# Patient Record
Sex: MALE | Race: WHITE | NOT HISPANIC OR LATINO | Employment: OTHER | URBAN - NONMETROPOLITAN AREA
[De-identification: names, ages, dates, MRNs, and addresses within clinical notes are randomized per-mention and may not be internally consistent; named-entity substitution may affect disease eponyms.]

---

## 2023-05-15 ENCOUNTER — HOSPITAL ENCOUNTER (INPATIENT)
Facility: HOSPITAL | Age: 68
LOS: 2 days | Discharge: HOME | End: 2023-05-17
Attending: INTERNAL MEDICINE | Admitting: INTERNAL MEDICINE
Payer: MEDICARE

## 2023-05-15 ENCOUNTER — APPOINTMENT (OUTPATIENT)
Dept: CT IMAGING | Facility: HOSPITAL | Age: 68
End: 2023-05-15
Payer: MEDICARE

## 2023-05-15 ENCOUNTER — APPOINTMENT (OUTPATIENT)
Dept: GENERAL RADIOLOGY | Facility: HOSPITAL | Age: 68
End: 2023-05-15
Payer: MEDICARE

## 2023-05-15 DIAGNOSIS — I21.4 NSTEMI (NON-ST ELEVATED MYOCARDIAL INFARCTION): ICD-10-CM

## 2023-05-15 DIAGNOSIS — R77.8 ELEVATED TROPONIN: ICD-10-CM

## 2023-05-15 DIAGNOSIS — R07.9 ACUTE CHEST PAIN: Primary | ICD-10-CM

## 2023-05-15 LAB
ALBUMIN SERPL-MCNC: 4.8 G/DL (ref 3.5–5.2)
ALBUMIN/GLOB SERPL: 1.6 G/DL
ALP SERPL-CCNC: 71 U/L (ref 39–117)
ALT SERPL W P-5'-P-CCNC: 35 U/L (ref 1–41)
ANION GAP SERPL CALCULATED.3IONS-SCNC: 13 MMOL/L (ref 5–15)
AST SERPL-CCNC: 169 U/L (ref 1–40)
BASOPHILS # BLD AUTO: 0.06 10*3/MM3 (ref 0–0.2)
BASOPHILS NFR BLD AUTO: 0.4 % (ref 0–1.5)
BILIRUB SERPL-MCNC: 0.6 MG/DL (ref 0–1.2)
BUN SERPL-MCNC: 10 MG/DL (ref 8–23)
BUN/CREAT SERPL: 11 (ref 7–25)
CALCIUM SPEC-SCNC: 9.8 MG/DL (ref 8.6–10.5)
CHLORIDE SERPL-SCNC: 100 MMOL/L (ref 98–107)
CO2 SERPL-SCNC: 26 MMOL/L (ref 22–29)
CREAT SERPL-MCNC: 0.91 MG/DL (ref 0.76–1.27)
D DIMER PPP FEU-MCNC: 1.91 MCGFEU/ML (ref 0–0.66)
DEPRECATED RDW RBC AUTO: 41.1 FL (ref 37–54)
EGFRCR SERPLBLD CKD-EPI 2021: 93 ML/MIN/1.73
EOSINOPHIL # BLD AUTO: 0.11 10*3/MM3 (ref 0–0.4)
EOSINOPHIL NFR BLD AUTO: 0.7 % (ref 0.3–6.2)
ERYTHROCYTE [DISTWIDTH] IN BLOOD BY AUTOMATED COUNT: 12.9 % (ref 12.3–15.4)
GEN 5 2HR TROPONIN T REFLEX: 2418 NG/L
GLOBULIN UR ELPH-MCNC: 3 GM/DL
GLUCOSE SERPL-MCNC: 98 MG/DL (ref 65–99)
HCT VFR BLD AUTO: 46.5 % (ref 37.5–51)
HGB BLD-MCNC: 15.5 G/DL (ref 13–17.7)
HOLD SPECIMEN: NORMAL
HOLD SPECIMEN: NORMAL
IMM GRANULOCYTES # BLD AUTO: 0.04 10*3/MM3 (ref 0–0.05)
IMM GRANULOCYTES NFR BLD AUTO: 0.3 % (ref 0–0.5)
LIPASE SERPL-CCNC: 16 U/L (ref 13–60)
LYMPHOCYTES # BLD AUTO: 3.07 10*3/MM3 (ref 0.7–3.1)
LYMPHOCYTES NFR BLD AUTO: 20.8 % (ref 19.6–45.3)
MAGNESIUM SERPL-MCNC: 1.9 MG/DL (ref 1.6–2.4)
MCH RBC QN AUTO: 29.4 PG (ref 26.6–33)
MCHC RBC AUTO-ENTMCNC: 33.3 G/DL (ref 31.5–35.7)
MCV RBC AUTO: 88.1 FL (ref 79–97)
MONOCYTES # BLD AUTO: 1.14 10*3/MM3 (ref 0.1–0.9)
MONOCYTES NFR BLD AUTO: 7.7 % (ref 5–12)
NEUTROPHILS NFR BLD AUTO: 10.35 10*3/MM3 (ref 1.7–7)
NEUTROPHILS NFR BLD AUTO: 70.1 % (ref 42.7–76)
NRBC BLD AUTO-RTO: 0 /100 WBC (ref 0–0.2)
NT-PROBNP SERPL-MCNC: 1351 PG/ML (ref 0–900)
PLATELET # BLD AUTO: 246 10*3/MM3 (ref 140–450)
PMV BLD AUTO: 10.4 FL (ref 6–12)
POTASSIUM SERPL-SCNC: 3.5 MMOL/L (ref 3.5–5.2)
PROT SERPL-MCNC: 7.8 G/DL (ref 6–8.5)
RBC # BLD AUTO: 5.28 10*6/MM3 (ref 4.14–5.8)
SODIUM SERPL-SCNC: 139 MMOL/L (ref 136–145)
TROPONIN T DELTA: -121 NG/L
TROPONIN T SERPL HS-MCNC: 2539 NG/L
WBC NRBC COR # BLD: 14.77 10*3/MM3 (ref 3.4–10.8)
WHOLE BLOOD HOLD COAG: NORMAL
WHOLE BLOOD HOLD SPECIMEN: NORMAL

## 2023-05-15 PROCEDURE — 99285 EMERGENCY DEPT VISIT HI MDM: CPT

## 2023-05-15 PROCEDURE — 71045 X-RAY EXAM CHEST 1 VIEW: CPT

## 2023-05-15 PROCEDURE — 71275 CT ANGIOGRAPHY CHEST: CPT

## 2023-05-15 PROCEDURE — 85025 COMPLETE CBC W/AUTO DIFF WBC: CPT | Performed by: EMERGENCY MEDICINE

## 2023-05-15 PROCEDURE — 83690 ASSAY OF LIPASE: CPT

## 2023-05-15 PROCEDURE — 93005 ELECTROCARDIOGRAM TRACING: CPT | Performed by: INTERNAL MEDICINE

## 2023-05-15 PROCEDURE — 83880 ASSAY OF NATRIURETIC PEPTIDE: CPT

## 2023-05-15 PROCEDURE — 93010 ELECTROCARDIOGRAM REPORT: CPT | Performed by: INTERNAL MEDICINE

## 2023-05-15 PROCEDURE — 83735 ASSAY OF MAGNESIUM: CPT

## 2023-05-15 PROCEDURE — 84484 ASSAY OF TROPONIN QUANT: CPT | Performed by: EMERGENCY MEDICINE

## 2023-05-15 PROCEDURE — 25510000001 IOPAMIDOL PER 1 ML

## 2023-05-15 PROCEDURE — 93005 ELECTROCARDIOGRAM TRACING: CPT

## 2023-05-15 PROCEDURE — 93005 ELECTROCARDIOGRAM TRACING: CPT | Performed by: EMERGENCY MEDICINE

## 2023-05-15 PROCEDURE — 85379 FIBRIN DEGRADATION QUANT: CPT

## 2023-05-15 PROCEDURE — 80053 COMPREHEN METABOLIC PANEL: CPT | Performed by: EMERGENCY MEDICINE

## 2023-05-15 RX ORDER — ASPIRIN 81 MG/1
324 TABLET, CHEWABLE ORAL ONCE
Status: DISCONTINUED | OUTPATIENT
Start: 2023-05-15 | End: 2023-05-17

## 2023-05-15 RX ORDER — SODIUM CHLORIDE 9 MG/ML
40 INJECTION, SOLUTION INTRAVENOUS AS NEEDED
Status: DISCONTINUED | OUTPATIENT
Start: 2023-05-15 | End: 2023-05-17 | Stop reason: HOSPADM

## 2023-05-15 RX ORDER — SODIUM CHLORIDE 0.9 % (FLUSH) 0.9 %
10 SYRINGE (ML) INJECTION AS NEEDED
Status: DISCONTINUED | OUTPATIENT
Start: 2023-05-15 | End: 2023-05-17 | Stop reason: HOSPADM

## 2023-05-15 RX ORDER — SODIUM CHLORIDE 0.9 % (FLUSH) 0.9 %
10 SYRINGE (ML) INJECTION EVERY 12 HOURS SCHEDULED
Status: DISCONTINUED | OUTPATIENT
Start: 2023-05-15 | End: 2023-05-17 | Stop reason: HOSPADM

## 2023-05-15 RX ORDER — ATORVASTATIN CALCIUM 40 MG/1
40 TABLET, FILM COATED ORAL NIGHTLY
Status: DISCONTINUED | OUTPATIENT
Start: 2023-05-15 | End: 2023-05-17 | Stop reason: HOSPADM

## 2023-05-15 RX ORDER — ENOXAPARIN SODIUM 150 MG/ML
1 INJECTION SUBCUTANEOUS ONCE
Status: COMPLETED | OUTPATIENT
Start: 2023-05-15 | End: 2023-05-16

## 2023-05-15 RX ORDER — LABETALOL HYDROCHLORIDE 5 MG/ML
10 INJECTION, SOLUTION INTRAVENOUS ONCE
Status: DISCONTINUED | OUTPATIENT
Start: 2023-05-15 | End: 2023-05-15

## 2023-05-15 RX ORDER — FAMOTIDINE 10 MG/ML
20 INJECTION, SOLUTION INTRAVENOUS ONCE
Status: COMPLETED | OUTPATIENT
Start: 2023-05-15 | End: 2023-05-15

## 2023-05-15 RX ORDER — LABETALOL HYDROCHLORIDE 5 MG/ML
20 INJECTION, SOLUTION INTRAVENOUS ONCE
Status: COMPLETED | OUTPATIENT
Start: 2023-05-15 | End: 2023-05-15

## 2023-05-15 RX ORDER — LISINOPRIL 5 MG/1
5 TABLET ORAL DAILY
Status: DISCONTINUED | OUTPATIENT
Start: 2023-05-16 | End: 2023-05-17 | Stop reason: HOSPADM

## 2023-05-15 RX ORDER — CARVEDILOL 3.12 MG/1
3.12 TABLET ORAL 2 TIMES DAILY WITH MEALS
Status: DISCONTINUED | OUTPATIENT
Start: 2023-05-15 | End: 2023-05-17 | Stop reason: HOSPADM

## 2023-05-15 RX ORDER — NITROGLYCERIN 0.4 MG/1
0.4 TABLET SUBLINGUAL
Status: DISCONTINUED | OUTPATIENT
Start: 2023-05-15 | End: 2023-05-17 | Stop reason: HOSPADM

## 2023-05-15 RX ADMIN — FAMOTIDINE 20 MG: 10 INJECTION INTRAVENOUS at 19:34

## 2023-05-15 RX ADMIN — LABETALOL HYDROCHLORIDE 20 MG: 5 INJECTION INTRAVENOUS at 19:33

## 2023-05-15 RX ADMIN — IOPAMIDOL 100 ML: 755 INJECTION, SOLUTION INTRAVENOUS at 20:48

## 2023-05-15 NOTE — ED PROVIDER NOTES
Subjective   History of Present Illness  Patient is a 67-year-old male who presents emergency department complaining of chest pain.  He states that he was awoken from sleep last night around 12 AM.  He also had a burning sensation in his throat and belched a lot.  He states that the chest pain is a pressure/dull pain that radiates to both arms.  He denies radiation to his back or neck.  He states that he went to see his primary care provider, Dr. Busch, and they sent him to the emergency department.  He took aspirin prior to arrival to the emergency department.  He denies any nausea, vomiting, diarrhea.  Complains of some shortness of breath.  Pain is not worse whenever he takes a deep breath or with exertion.  He is currently not having any chest pain right now.  Denies any diaphoresis.  He denies any tobacco use.  Denies any alcohol use.  States that he has high blood pressure at home, however he has not followed up with the primary care provider in several years.  He does not take any medications for this.  States that his father had a heart attack in his 50s.        Review of Systems   Constitutional: Negative.    HENT: Negative.    Respiratory: Positive for shortness of breath. Negative for cough and wheezing.    Cardiovascular: Positive for chest pain. Negative for palpitations and leg swelling.   Genitourinary: Negative.    Musculoskeletal: Negative.    Skin: Negative.    Neurological: Negative.        History reviewed. No pertinent past medical history.    No Known Allergies    History reviewed. No pertinent surgical history.    History reviewed. No pertinent family history.    Social History     Socioeconomic History   • Marital status:            Objective   Physical Exam  Vitals and nursing note reviewed.   Constitutional:       General: He is not in acute distress.     Appearance: He is well-developed. He is obese. He is not ill-appearing or toxic-appearing.   HENT:      Head: Normocephalic.    Cardiovascular:      Rate and Rhythm: Normal rate and regular rhythm.      Heart sounds: Normal heart sounds.   Pulmonary:      Effort: Pulmonary effort is normal.      Breath sounds: Normal breath sounds.   Abdominal:      General: Bowel sounds are normal.      Palpations: Abdomen is soft.   Musculoskeletal:         General: Normal range of motion.      Cervical back: Normal range of motion.      Right lower leg: No edema.      Left lower leg: No edema.   Skin:     General: Skin is warm.   Neurological:      General: No focal deficit present.      Mental Status: He is alert and oriented to person, place, and time.   Psychiatric:         Mood and Affect: Mood normal.         Behavior: Behavior normal.         Procedures           ED Course  ED Course as of 05/15/23 6597   Mon May 15, 2023   2010 Updated patient about labs. Ordered CTA chest. [KR]   9004 Spoke with Dr. Mims, on-call cardiologist, about lab work and CTA chest results.  I told him that he received aspirin before arriving to the emergency department.  He instructed to order Lovenox 1 mg/kg as well.  Dr. Mims has agreed to admitting this patient.  Plan to admit the patient to telemetry.  Patient agreeable to this plan. [KR]      ED Course User Index  [KR] Lesa Jonas APRN                                           Medical Decision Making  Lm Nguyễn is a very pleasant 67 y.o. male who presents to the ED for chest pain.     Patient was non-toxic and not-ill appearing on arrival.  Hypertensive on arrival, but other vital signs stable.    Patient's presentation raises suspicion for differentials including, but not limited to, ACS, NSTEMI, pulmonary embolism, pneumonia.     Given this, Lm was placed on the monitor. Laboratory studies and imaging studies were ordered including CBC, CMP, lipase, BNP, D-dimer, high-sensitivity troponin, magnesium, EKG, chest x-ray, CTA chest.     High-sensitivity troponin 2539 with a reflex of 2418.  Delta -121.   BNP 1351.  D-dimer 1.91, which warranted a CTA chest.    Lm was given IV Pepcid. He was also given IV labetalol for hypertension. Therapeutic Lovenox 1 mg/kg was ordered per Dr. Mims request.  Patient stated that he took aspirin prior to arrival to the emergency department.    Imaging was reviewed by Dr. Jimmy Pretty.  Chest x-ray reveals no acute disease.  CTA chest reveals no evidence of pulmonary thromboembolic disease. No evidence of aneurysm or dissection. There is some mild plaquing and mural thrombus with mild stenosis in the proximal segment left subclavian artery. No discrete intimal flap is demonstrated. No evidence of consolidative pneumonia or effusion. There is mild basilar atelectasis. Cardiomegaly with moderate coronary calcifications. Elevated right heart pressure suspected with reflux of contrast into the intrahepatic IVC. Mild bronchial wall thickening bilaterally suggesting an element of bronchitis or reactive airway disease.    Decision rules/scores evaluated: Heart score 4    Labs were reviewed and pertinent for elevated troponin and acute chest pain. Likely NSTEMI given patient's symptoms and elevated troponin.  EKG revealed T wave inversions in the lateral leads, no ST elevation.  CTA chest ruled out pulmonary embolism or infection. Case discussed with Dr. Mims, on-call cardiologist. Informed him of patient symptoms, elevated troponin and CTA chest results. Dr. Mims graciously agreed to admitting this patient.    I discussed lab work and imaging with the patient and wife.  I discussed that Dr. Mims plans to admit this patient for further testing.  They are agreeable to this plan.    Signed by:   OPAL Loving 5/15/2023 23:16 CDT     Dragon disclaimer:  Part of this note may be an electronic transcription/translation of spoken language to printed text using the Dragon Dictation System.     Acute chest pain: acute illness or injury  Elevated troponin: acute illness or  injury  Amount and/or Complexity of Data Reviewed  Radiology: ordered.  ECG/medicine tests: ordered.      Risk  Prescription drug management.  Decision regarding hospitalization.          Final diagnoses:   Acute chest pain   Elevated troponin       ED Disposition  ED Disposition     ED Disposition   Decision to Admit    Condition   --    Comment   Level of Care: Telemetry [5]   Diagnosis: Acute chest pain [961144]   Admitting Physician: TAINA CURRY [[id number]]               No follow-up provider specified.       Medication List      No changes were made to your prescriptions during this visit.          Lesa Jonas, APRN  05/15/23 4513

## 2023-05-15 NOTE — Clinical Note
First balloon inflation max pressure = 14 guillermo. First balloon inflation duration = 20 seconds. Second inflation of balloon - Max pressure = 16 guillermo. 2nd Inflation of balloon - Duration = 20 seconds.

## 2023-05-15 NOTE — Clinical Note
Level of Care: Telemetry [5]   Diagnosis: NSTEMI (non-ST elevated myocardial infarction) [865884]   Certification: I Certify That Inpatient Hospital Services Are Medically Necessary For Greater Than 2 Midnights

## 2023-05-15 NOTE — Clinical Note
First balloon inflation max pressure = 18 guillermo. First balloon inflation duration = 20 seconds. Second inflation of balloon - Max pressure = 18 guillermo. 2nd Inflation of balloon - Duration = 20 seconds.

## 2023-05-15 NOTE — Clinical Note
First balloon inflation max pressure = 14 guillermo. First balloon inflation duration = 20 seconds. Second inflation of balloon - Max pressure = 14 guillermo. 2nd Inflation of balloon - Duration = 20 seconds.

## 2023-05-16 PROBLEM — R03.0 ELEVATED BLOOD PRESSURE READING: Status: ACTIVE | Noted: 2023-05-16

## 2023-05-16 PROBLEM — I73.9 SUBCLAVIAN ARTERY DISEASE: Status: ACTIVE | Noted: 2023-05-16

## 2023-05-16 PROBLEM — E78.00 PURE HYPERCHOLESTEROLEMIA: Status: ACTIVE | Noted: 2023-05-16

## 2023-05-16 LAB
ACT BLD: 209 SECONDS (ref 82–152)
ANION GAP SERPL CALCULATED.3IONS-SCNC: 12 MMOL/L (ref 5–15)
BUN SERPL-MCNC: 12 MG/DL (ref 8–23)
BUN/CREAT SERPL: 12 (ref 7–25)
CALCIUM SPEC-SCNC: 9.6 MG/DL (ref 8.6–10.5)
CHLORIDE SERPL-SCNC: 101 MMOL/L (ref 98–107)
CHOLEST SERPL-MCNC: 266 MG/DL (ref 0–200)
CO2 SERPL-SCNC: 24 MMOL/L (ref 22–29)
CREAT SERPL-MCNC: 1 MG/DL (ref 0.76–1.27)
DEPRECATED RDW RBC AUTO: 41.8 FL (ref 37–54)
EGFRCR SERPLBLD CKD-EPI 2021: 83 ML/MIN/1.73
ERYTHROCYTE [DISTWIDTH] IN BLOOD BY AUTOMATED COUNT: 12.8 % (ref 12.3–15.4)
GLUCOSE SERPL-MCNC: 127 MG/DL (ref 65–99)
HBA1C MFR BLD: 5.8 % (ref 4.8–5.6)
HCT VFR BLD AUTO: 45.4 % (ref 37.5–51)
HDLC SERPL-MCNC: 33 MG/DL (ref 40–60)
HGB BLD-MCNC: 15.1 G/DL (ref 13–17.7)
LDLC SERPL CALC-MCNC: 206 MG/DL (ref 0–100)
LDLC/HDLC SERPL: 6.18 {RATIO}
MCH RBC QN AUTO: 29.6 PG (ref 26.6–33)
MCHC RBC AUTO-ENTMCNC: 33.3 G/DL (ref 31.5–35.7)
MCV RBC AUTO: 89 FL (ref 79–97)
PLATELET # BLD AUTO: 243 10*3/MM3 (ref 140–450)
PMV BLD AUTO: 10.5 FL (ref 6–12)
POTASSIUM SERPL-SCNC: 3.6 MMOL/L (ref 3.5–5.2)
RBC # BLD AUTO: 5.1 10*6/MM3 (ref 4.14–5.8)
SODIUM SERPL-SCNC: 137 MMOL/L (ref 136–145)
TRIGL SERPL-MCNC: 146 MG/DL (ref 0–150)
VLDLC SERPL-MCNC: 27 MG/DL (ref 5–40)
WBC NRBC COR # BLD: 11.52 10*3/MM3 (ref 3.4–10.8)

## 2023-05-16 PROCEDURE — C1894 INTRO/SHEATH, NON-LASER: HCPCS | Performed by: EMERGENCY MEDICINE

## 2023-05-16 PROCEDURE — 25010000002 HEPARIN (PORCINE) 2000-0.9 UNIT/L-% SOLUTION: Performed by: EMERGENCY MEDICINE

## 2023-05-16 PROCEDURE — C1887 CATHETER, GUIDING: HCPCS | Performed by: EMERGENCY MEDICINE

## 2023-05-16 PROCEDURE — 99152 MOD SED SAME PHYS/QHP 5/>YRS: CPT | Performed by: EMERGENCY MEDICINE

## 2023-05-16 PROCEDURE — C1874 STENT, COATED/COV W/DEL SYS: HCPCS | Performed by: EMERGENCY MEDICINE

## 2023-05-16 PROCEDURE — 93458 L HRT ARTERY/VENTRICLE ANGIO: CPT | Performed by: EMERGENCY MEDICINE

## 2023-05-16 PROCEDURE — 25010000002 FENTANYL CITRATE (PF) 50 MCG/ML SOLUTION: Performed by: EMERGENCY MEDICINE

## 2023-05-16 PROCEDURE — 85347 COAGULATION TIME ACTIVATED: CPT

## 2023-05-16 PROCEDURE — B2111ZZ FLUOROSCOPY OF MULTIPLE CORONARY ARTERIES USING LOW OSMOLAR CONTRAST: ICD-10-PCS | Performed by: EMERGENCY MEDICINE

## 2023-05-16 PROCEDURE — 80061 LIPID PANEL: CPT | Performed by: INTERNAL MEDICINE

## 2023-05-16 PROCEDURE — C1769 GUIDE WIRE: HCPCS | Performed by: EMERGENCY MEDICINE

## 2023-05-16 PROCEDURE — 93010 ELECTROCARDIOGRAM REPORT: CPT | Performed by: INTERNAL MEDICINE

## 2023-05-16 PROCEDURE — C1725 CATH, TRANSLUMIN NON-LASER: HCPCS | Performed by: EMERGENCY MEDICINE

## 2023-05-16 PROCEDURE — 25010000002 HEPARIN (PORCINE) 1000-0.9 UT/500ML-% SOLUTION: Performed by: EMERGENCY MEDICINE

## 2023-05-16 PROCEDURE — 83036 HEMOGLOBIN GLYCOSYLATED A1C: CPT | Performed by: INTERNAL MEDICINE

## 2023-05-16 PROCEDURE — B2151ZZ FLUOROSCOPY OF LEFT HEART USING LOW OSMOLAR CONTRAST: ICD-10-PCS | Performed by: EMERGENCY MEDICINE

## 2023-05-16 PROCEDURE — 25010000002 HEPARIN (PORCINE) PER 1000 UNITS: Performed by: EMERGENCY MEDICINE

## 2023-05-16 PROCEDURE — 25010000002 MIDAZOLAM PER 1 MG: Performed by: EMERGENCY MEDICINE

## 2023-05-16 PROCEDURE — 80048 BASIC METABOLIC PNL TOTAL CA: CPT | Performed by: INTERNAL MEDICINE

## 2023-05-16 PROCEDURE — 99153 MOD SED SAME PHYS/QHP EA: CPT | Performed by: EMERGENCY MEDICINE

## 2023-05-16 PROCEDURE — 25010000002 ENOXAPARIN PER 10 MG

## 2023-05-16 PROCEDURE — 85027 COMPLETE CBC AUTOMATED: CPT | Performed by: INTERNAL MEDICINE

## 2023-05-16 PROCEDURE — 36415 COLL VENOUS BLD VENIPUNCTURE: CPT | Performed by: INTERNAL MEDICINE

## 2023-05-16 PROCEDURE — 99223 1ST HOSP IP/OBS HIGH 75: CPT | Performed by: NURSE PRACTITIONER

## 2023-05-16 PROCEDURE — 4A023N7 MEASUREMENT OF CARDIAC SAMPLING AND PRESSURE, LEFT HEART, PERCUTANEOUS APPROACH: ICD-10-PCS | Performed by: EMERGENCY MEDICINE

## 2023-05-16 PROCEDURE — 92928 PRQ TCAT PLMT NTRAC ST 1 LES: CPT | Performed by: EMERGENCY MEDICINE

## 2023-05-16 PROCEDURE — C9600 PERC DRUG-EL COR STENT SING: HCPCS | Performed by: EMERGENCY MEDICINE

## 2023-05-16 PROCEDURE — 25510000001 IOPAMIDOL PER 1 ML: Performed by: EMERGENCY MEDICINE

## 2023-05-16 PROCEDURE — B41F1ZZ FLUOROSCOPY OF RIGHT LOWER EXTREMITY ARTERIES USING LOW OSMOLAR CONTRAST: ICD-10-PCS | Performed by: EMERGENCY MEDICINE

## 2023-05-16 PROCEDURE — 027034Z DILATION OF CORONARY ARTERY, ONE ARTERY WITH DRUG-ELUTING INTRALUMINAL DEVICE, PERCUTANEOUS APPROACH: ICD-10-PCS | Performed by: EMERGENCY MEDICINE

## 2023-05-16 PROCEDURE — C1760 CLOSURE DEV, VASC: HCPCS | Performed by: EMERGENCY MEDICINE

## 2023-05-16 PROCEDURE — 25010000002 DIPHENHYDRAMINE PER 50 MG: Performed by: EMERGENCY MEDICINE

## 2023-05-16 DEVICE — XIENCE SKYPOINT™ EVEROLIMUS ELUTING CORONARY STENT SYSTEM 4.50 MM X 18 MM / RAPID-EXCHANGE
Type: IMPLANTABLE DEVICE | Site: CORONARY | Status: FUNCTIONAL
Brand: XIENCE SKYPOINT™

## 2023-05-16 RX ORDER — MIDAZOLAM HYDROCHLORIDE 1 MG/ML
INJECTION INTRAMUSCULAR; INTRAVENOUS
Status: DISCONTINUED | OUTPATIENT
Start: 2023-05-16 | End: 2023-05-16 | Stop reason: HOSPADM

## 2023-05-16 RX ORDER — NITROGLYCERIN 0.4 MG/1
0.4 TABLET SUBLINGUAL
Status: DISCONTINUED | OUTPATIENT
Start: 2023-05-16 | End: 2023-05-17 | Stop reason: HOSPADM

## 2023-05-16 RX ORDER — LIDOCAINE HYDROCHLORIDE 20 MG/ML
INJECTION, SOLUTION INFILTRATION; PERINEURAL
Status: DISCONTINUED | OUTPATIENT
Start: 2023-05-16 | End: 2023-05-16 | Stop reason: HOSPADM

## 2023-05-16 RX ORDER — HEPARIN SODIUM 5000 [USP'U]/ML
INJECTION, SOLUTION INTRAVENOUS; SUBCUTANEOUS
Status: DISCONTINUED | OUTPATIENT
Start: 2023-05-16 | End: 2023-05-16 | Stop reason: HOSPADM

## 2023-05-16 RX ORDER — SODIUM CHLORIDE 9 MG/ML
100 INJECTION, SOLUTION INTRAVENOUS CONTINUOUS
Status: DISCONTINUED | OUTPATIENT
Start: 2023-05-16 | End: 2023-05-17 | Stop reason: HOSPADM

## 2023-05-16 RX ORDER — FENTANYL CITRATE 50 UG/ML
INJECTION, SOLUTION INTRAMUSCULAR; INTRAVENOUS
Status: DISCONTINUED | OUTPATIENT
Start: 2023-05-16 | End: 2023-05-16 | Stop reason: HOSPADM

## 2023-05-16 RX ORDER — ACETAMINOPHEN 325 MG/1
650 TABLET ORAL EVERY 4 HOURS PRN
Status: DISCONTINUED | OUTPATIENT
Start: 2023-05-16 | End: 2023-05-17 | Stop reason: HOSPADM

## 2023-05-16 RX ORDER — SODIUM CHLORIDE 9 MG/ML
40 INJECTION, SOLUTION INTRAVENOUS AS NEEDED
Status: DISCONTINUED | OUTPATIENT
Start: 2023-05-16 | End: 2023-05-16 | Stop reason: HOSPADM

## 2023-05-16 RX ORDER — SODIUM CHLORIDE 0.9 % (FLUSH) 0.9 %
3-10 SYRINGE (ML) INJECTION AS NEEDED
Status: DISCONTINUED | OUTPATIENT
Start: 2023-05-16 | End: 2023-05-16 | Stop reason: HOSPADM

## 2023-05-16 RX ORDER — DIPHENHYDRAMINE HYDROCHLORIDE 50 MG/ML
INJECTION INTRAMUSCULAR; INTRAVENOUS
Status: DISCONTINUED | OUTPATIENT
Start: 2023-05-16 | End: 2023-05-16 | Stop reason: HOSPADM

## 2023-05-16 RX ORDER — HEPARIN SODIUM 200 [USP'U]/100ML
INJECTION, SOLUTION INTRAVENOUS
Status: DISCONTINUED | OUTPATIENT
Start: 2023-05-16 | End: 2023-05-16 | Stop reason: HOSPADM

## 2023-05-16 RX ORDER — ONDANSETRON 2 MG/ML
4 INJECTION INTRAMUSCULAR; INTRAVENOUS EVERY 6 HOURS PRN
Status: DISCONTINUED | OUTPATIENT
Start: 2023-05-16 | End: 2023-05-16 | Stop reason: HOSPADM

## 2023-05-16 RX ORDER — NITROGLYCERIN 0.4 MG/1
0.4 TABLET SUBLINGUAL
Status: DISCONTINUED | OUTPATIENT
Start: 2023-05-16 | End: 2023-05-16 | Stop reason: HOSPADM

## 2023-05-16 RX ORDER — SODIUM CHLORIDE 0.9 % (FLUSH) 0.9 %
3 SYRINGE (ML) INJECTION EVERY 12 HOURS SCHEDULED
Status: DISCONTINUED | OUTPATIENT
Start: 2023-05-16 | End: 2023-05-16 | Stop reason: HOSPADM

## 2023-05-16 RX ADMIN — SODIUM CHLORIDE 100 ML/HR: 9 INJECTION, SOLUTION INTRAVENOUS at 22:27

## 2023-05-16 RX ADMIN — SODIUM CHLORIDE 100 ML/HR: 9 INJECTION, SOLUTION INTRAVENOUS at 12:45

## 2023-05-16 RX ADMIN — ENOXAPARIN SODIUM 105 MG: 150 INJECTION SUBCUTANEOUS at 00:43

## 2023-05-16 RX ADMIN — ASPIRIN 81 MG: 81 TABLET, COATED ORAL at 08:55

## 2023-05-16 RX ADMIN — Medication 10 ML: at 00:44

## 2023-05-16 RX ADMIN — ATORVASTATIN CALCIUM 40 MG: 40 TABLET, FILM COATED ORAL at 00:43

## 2023-05-16 RX ADMIN — TICAGRELOR 90 MG: 90 TABLET ORAL at 22:31

## 2023-05-16 RX ADMIN — LISINOPRIL 5 MG: 5 TABLET ORAL at 08:55

## 2023-05-16 RX ADMIN — CARVEDILOL 3.12 MG: 3.12 TABLET, FILM COATED ORAL at 00:43

## 2023-05-16 RX ADMIN — CARVEDILOL 3.12 MG: 3.12 TABLET, FILM COATED ORAL at 08:55

## 2023-05-16 RX ADMIN — CARVEDILOL 3.12 MG: 3.12 TABLET, FILM COATED ORAL at 17:08

## 2023-05-16 RX ADMIN — ATORVASTATIN CALCIUM 40 MG: 40 TABLET, FILM COATED ORAL at 20:09

## 2023-05-16 RX ADMIN — Medication 10 ML: at 08:55

## 2023-05-16 NOTE — PAYOR COMM NOTE
"5/16/23. [facility] [phone]  [phone]  ER ADMIT TO OBSERVATION ON 5/15/23. INPATIENT ORDER ON 5/16/23.    FAXING FOR REVIEW.                SHIRA GARCIA     Date of Birth   1955    Social Security Number       Address   [address] [address]    Home Phone       MRN   [id number]       [employer]   [employer]    Marital Status                               Admission Date   5/15/23    Admission Type   Emergency    Admitting Provider   Dwayne Mims MD    Attending Provider   Dwayne Mims MD    Department, Room/Bed   [facility] CATH LAB, Pool/CATH       Discharge Date       Discharge Disposition       Discharge Destination                                 Attending Provider: Dwayne Mims MD    Allergies: No Known Allergies    Isolation: None   Infection: None   Code Status: Not on file    Ht: 180.3 cm (71\")   Wt: 105 kg (231 lb 6.4 oz)    Admission Cmt: None   Principal Problem: NSTEMI (non-ST elevated myocardial infarction) [I21.4]                   Active Insurance as of 5/15/2023       Primary Coverage       Payor Plan Insurance Group Employer/Plan Group    HUMANA MEDICARE REPLACEMENT HUMANA MEDICARE REPLACEMENT [id number]       Payor Plan Address Payor Plan Phone Number Payor Plan Fax Number Effective Dates    [address] [address] [phone]  1/1/2022 - None Entered    [facility] [address]         Subscriber Name Subscriber Birth Date Member ID       SHIRA GARCIA 1955 [id number]                     Emergency Contacts        (Rel.) Home Phone Work Phone Mobile Phone    veena thomas (Relative) -- -- [phone]          Lesa Jonas APRN   Nurse Practitioner  Emergency Medicine     ED Provider Notes      Cosign Needed     Date of Service: 05/15/23 1837  Creation Time: 05/15/23 1837     Cosign Needed       Expand All Collapse All       Subjective []Expand by Default     History of Present " Illness  Patient is a 67-year-old male who presents emergency department complaining of chest pain.  He states that he was awoken from sleep last night around 12 AM.  He also had a burning sensation in his throat and belched a lot.  He states that the chest pain is a pressure/dull pain that radiates to both arms.  He denies radiation to his back or neck.  He states that he went to see his primary care provider, Dr. Busch, and they sent him to the emergency department.  He took aspirin prior to arrival to the emergency department.  He denies any nausea, vomiting, diarrhea.  Complains of some shortness of breath.  Pain is not worse whenever he takes a deep breath or with exertion.  He is currently not having any chest pain right now.  Denies any diaphoresis.  He denies any tobacco use.  Denies any alcohol use.  States that he has high blood pressure at home, however he has not followed up with the primary care provider in several years.  He does not take any medications for this.  States that his father had a heart attack in his 50s.           Review of Systems  Constitutional: Negative.    HENT: Negative.    Respiratory: Positive for shortness of breath. Negative for cough and wheezing.    Cardiovascular: Positive for chest pain. Negative for palpitations and leg swelling.  Genitourinary: Negative.    Musculoskeletal: Negative.    Skin: Negative.    Neurological: Negative.                   cesar Nguyễn is a very pleasant 67 y.o. male who presents to the ED for chest pain.      Patient was non-toxic and not-ill appearing on arrival.  Hypertensive on arrival, but other vital signs stable.     Patient's presentation raises suspicion for differentials including, but not limited to, ACS, NSTEMI, pulmonary embolism, pneumonia.      Given this, Lm was placed on the monitor. Laboratory studies and imaging studies were ordered including CBC, CMP, lipase, BNP, D-dimer, high-sensitivity troponin, magnesium, EKG, chest  x-ray, CTA chest.      High-sensitivity troponin 2539 with a reflex of 2418.  Delta -121.  BNP 1351.  D-dimer 1.91, which warranted a CTA chest.     Lm was given IV Pepcid. He was also given IV labetalol for hypertension. Therapeutic Lovenox 1 mg/kg was ordered per Dr. Mims request.  Patient stated that he took aspirin prior to arrival to the emergency department.     Imaging was reviewed by Dr. Jimmy Pretty.  Chest x-ray reveals no acute disease.  CTA chest reveals no evidence of pulmonary thromboembolic disease. No evidence of aneurysm or dissection. There is some mild plaquing and mural thrombus with mild stenosis in the proximal segment left subclavian artery. No discrete intimal flap is demonstrated. No evidence of consolidative pneumonia or effusion. There is mild basilar atelectasis. Cardiomegaly with moderate coronary calcifications. Elevated right heart pressure suspected with reflux of contrast into the intrahepatic IVC. Mild bronchial wall thickening bilaterally suggesting an element of bronchitis or reactive airway disease.     Decision rules/scores evaluated: Heart score 4     Labs were reviewed and pertinent for elevated troponin and acute chest pain. Likely NSTEMI given patient's symptoms and elevated troponin.  EKG revealed T wave inversions in the lateral leads, no ST elevation.  CTA chest ruled out pulmonary embolism or infection. Case discussed with Dr. Mims, on-call cardiologist. Informed him of patient symptoms, elevated troponin and CTA chest results. Dr. Mims graciously agreed to admitting this patient.        Acute chest pain: acute illness or injury  Elevated troponin: acute illness or injury  Amount and/or Complexity of Data Reviewed  Radiology: ordered.  ECG/medicine tests: ordered.        Risk  Prescription drug management.  Decision regarding hospitalization.              Final diagnoses:   Acute chest pain   Elevated troponin         ED Disposition        ED Disposition       ED Disposition   Decision to Admit    Condition   --    Comment   Level of Care: Telemetry [5]   Diagnosis: Acute chest pain [[id number]]   Admitting Physician: TAINA CURRY [[id number]]                         Marco Antonio Soria APRN   Nurse Practitioner  Cardiology     H&P      Cosign Needed     Date of Service: 05/16/23 0840  Creation Time: 05/16/23 0840     Cosign Needed       Expand All Collapse All    [facility] HEART [facility] -  History and Physical      LOS: 1 day   Patient Care Team:  Provider, No Known as PCP - General     Chief Complaint: Chest Pain         Subjective []Expand by Default        Interval History:   Patient presents to the ER for chest pain. He notes this pain awoke him from his sleep. He notes he has been feeling fine leading up to this pain. He notes that 3-4 months ago he had an intense pain in his abdomen that he thought he might die for- but this has resolved. He is active and has not been limited. Denies fever. He notes last year he established with Dr. Busch. He takes no medications- notes he has no medical problems. He notes he has a family history of coronary artery disease. He smoked for roughly 10 years but quit 40 + years ago. In the ER he was found to have Elevated Troponin of 2,539 with ST changes on EKG. His LDL was found to be 206 and total 266. A1C 5.8. His WBC was 14 that trended down. Denies fever, cough or hematuria. He had an abnormal D-dimer and a CTA of chest was ordered. CTA showed no pulmonary embolism. He was noted to have mild plaquing and mural thrombus with mild stenosis of proximal segment of subclavian artery. Noted to have cardiomegaly with with coronary calcifications with elevated right heart pressure. Patient's blood pressure was elevated but has responded to Coreg and Lisinopril.      Review of Systems:      Review of Systems   Constitutional:  Negative for activity change, appetite change, chills, fatigue, fever and unexpected weight change.   HENT:   Negative for congestion, dental problem and trouble swallowing.    Eyes:  Negative for visual disturbance.   Respiratory:  Negative for cough, chest tightness and shortness of breath.    Cardiovascular:  Positive for chest pain. Negative for palpitations and leg swelling.   Gastrointestinal:  Negative for abdominal pain, diarrhea, nausea and vomiting.   Endocrine: Negative for cold intolerance and heat intolerance.   Genitourinary:  Negative for dysuria.   Musculoskeletal:  Negative for back pain and neck pain.   Skin:  Negative for color change and wound.   Allergic/Immunologic: Negative for immunocompromised state.   Neurological:  Negative for dizziness and light-headedness.   Hematological:  Negative for adenopathy. Does not bruise/bleed easily.   Psychiatric/Behavioral:  Negative for agitation.                       Vital Sign Min/Max for last 24 hours  Temp  Min: 98.7 °F (37.1 °C)  Max: 100 °F (37.8 °C)   BP  Min: 121/71  Max: 198/94   Pulse  Min: 70  Max: 83   Resp  Min: 16  Max: 18   SpO2  Min: 94 %  Max: 98 %   No data recorded   Weight  Min: 105 kg (231 lb 6.4 oz)  Max: 107 kg (236 lb)      Constitutional:       Appearance: Healthy appearance. Well-developed and not in distress.   Eyes:      Pupils: Pupils are equal, round, and reactive to light.   HENT:      Head: Normocephalic and atraumatic.    Mouth/Throat:      Pharynx: Oropharynx is clear.   Neck:      Vascular: No carotid bruit or JVD.   Pulmonary:      Effort: Pulmonary effort is normal.      Breath sounds: Normal breath sounds.   Cardiovascular:      Normal rate. Regular rhythm.   Pulses:     Intact distal pulses.   Edema:     Peripheral edema absent.   Abdominal:      General: Bowel sounds are normal.      Palpations: Abdomen is soft.   Musculoskeletal: Normal range of motion.      Cervical back: Normal range of motion and neck supple. Skin:     General: Skin is warm and dry.   Neurological:      Mental Status: Alert and oriented to person,  place, and time.      Deep Tendon Reflexes: Reflexes are normal and symmetric.   Psychiatric:         Behavior: Behavior normal.         Thought Content: Thought content normal.         Judgment: Judgment jarrod            Procedure Component Value Units Date/Time     Basic Metabolic Panel [[id number]]  (Abnormal) Collected: 05/16/23 0415     Specimen: Blood Updated: 05/16/23 0457       Glucose 127 mg/dL         BUN 12 mg/dL         Creatinine 1.00 mg/dL         Sodium 137 mmol/L         Potassium 3.6 mmol/L         Chloride 101 mmol/L         CO2 24.0 mmol/L         Calcium 9.6 mg/dL         BUN/Creatinine Ratio 12.0       Anion Gap 12.0 mmol/L         eGFR 83.0 mL/min/1.73       Narrative:       GFR Normal >60  Chronic Kidney Disease <60  Kidney Failure <15        Lipid Panel [[id number]]  (Abnormal) Collected: 05/16/23 0415     Specimen: Blood Updated: 05/16/23 0457       Total Cholesterol 266 mg/dL         Triglycerides 146 mg/dL         HDL Cholesterol 33 mg/dL         LDL Cholesterol  206 mg/dL         VLDL Cholesterol 27 mg/dL         LDL/HDL Ratio 6.18      Troponin T 2Hr [[id number]]  (Abnormal) Collected: 05/15/23 2201      Specimen: Blood Updated: 05/15/23 2239       HS Troponin T 2,418 ng/L         Troponin T Delta -121 ng/L       Narrative:           High Sensitivity Troponin T [[id number]]  (Abnormal) Collected: 05/15/23 1928     Specimen: Blood Updated: 05/15/23 2006       HS Troponin T 2,539 ng/L       Narrative:           D-dimer, Quantitative [[id number]]  (Abnormal) Collected: 05/15/23 1928     Specimen: Blood Updated: 05/15/23 1955       D-Dimer, Quantitative 1.91           Updated: 05/15/23 1940            WBC 14.77 10*3/mm3         RBC 5.28 10*6/mm3         Hemoglobin 15.5 g/dL         Hematocrit 46.5 %         MCV 88.1 fL         MCH 29.4 pg         MCHC 33.3 g/dL         RDW 12.9 %         RDW-SD 41.1 fl         MPV 10.4 fL         Platelets 246 10*3/mm3         Neutrophil % 70.1 %          Lymphocyte % 20.8 %         Monocyte % 7.7 %         Eosinophil % 0.7 %         Basophil % 0.4 %         Immature Grans % 0.3 %         Neutrophils, Absolute 10.35 10*3/mm3         Lymphocytes, Absolute 3.07 10*3/mm3         Monocytes, Absolute 1.14 10*3/mm3         Eosinophils, Absolute 0.11 10*3/mm3         Basophils, Absolute 0.06 10*3/mm3         Immature Grans, Absolute 0.04 10*3/mm3         nRBC 0.0             NSTEMI (non-ST elevated myocardial infarction)    Acute chest pain    Subclavian artery disease    Pure hypercholesterolemia    Elevated blood pressure reading     Plan:  NSTEMI - with acute chest pain. Now Pain free. Will plan for heart cath today. Given 325 Aspirin in the ER and started on 81 mg daily. Started on Lipitor, Coreg and Lisinopril. Given full dose Lovenox at 0034. Will plan for heart cath today. Echo ordered.       Subclavian Stenosis with Mural Thrombus - given full dose Lovenox. Needs heart cath today for NSTEMI. Consult Vascular surgery.      Pure hypercholesterolemia- patient denies a history. Started on Lipitor. Goal LDL less than 70.      Elevated Blood Pressure- much improved on Coreg and Lisinopril. No previous diagnosis.      Electronically signed by OPAL Lambert, 05/16/23, 8:40 AM CDT.               Cardiac Catheterization/Vascular Study [CATH01] (Order [id number])  Order    ICD-10-CM ICD-9-CM    NSTEMI (non-ST elevated myocardial infarction)    I21.4 410.70               Current Facility-Administered Medications   Medication Dose Route Frequency Provider Last Rate Last Admin    acetaminophen (TYLENOL) tablet 650 mg  650 mg Oral Q4H PRN Taco Castañeda, DO        aspirin chewable tablet 324 mg  324 mg Oral Once Taco Castañeda, DO        aspirin EC tablet 81 mg  81 mg Oral Daily Taco Castañeda DO   81 mg at 05/16/23 0855    atorvastatin (LIPITOR) tablet 40 mg  40 mg Oral Nightly Taco Castañeda, DO   40 mg at 05/16/23 0043     carvedilol (COREG) tablet 3.125 mg  3.125 mg Oral BID With Meals Taco Castañeda, DO   3.125 mg at 05/16/23 0855    lisinopril (PRINIVIL,ZESTRIL) tablet 5 mg  5 mg Oral Daily Taco Castañeda, DO   5 mg at 05/16/23 0855    nitroglycerin (NITROSTAT) SL tablet 0.4 mg  0.4 mg Sublingual Q5 Min PRN Taco Castañeda, DO        nitroglycerin (NITROSTAT) SL tablet 0.4 mg  0.4 mg Sublingual Q5 Min PRN Marco Antonio Soria, APRN        nitroglycerin (NITROSTAT) SL tablet 0.4 mg  0.4 mg Sublingual Q5 Min PRN Taco Castañeda, DO        ondansetron (ZOFRAN) injection 4 mg  4 mg Intravenous Q6H PRN Marco Antonio Soria, APRN        sodium chloride 0.9 % flush 10 mL  10 mL Intravenous PRN Taco Castañeda, DO        sodium chloride 0.9 % flush 10 mL  10 mL Intravenous Q12H Taco Castañeda DO   10 mL at 05/16/23 0855    sodium chloride 0.9 % flush 10 mL  10 mL Intravenous PRN Taco Castañeda, DO        sodium chloride 0.9 % flush 3 mL  3 mL Intravenous Q12H Marco Antonio Soria, APRN        sodium chloride 0.9 % flush 3-10 mL  3-10 mL Intravenous PRN Marco Antonio Soria, APRN        sodium chloride 0.9 % infusion 40 mL  40 mL Intravenous PRN Taco Castañeda, DO        sodium chloride 0.9 % infusion 40 mL  40 mL Intravenous PRN Marco Antonio Soria, APRN        sodium chloride 0.9 % infusion  100 mL/hr Intravenous Continuous Taco Castañeda,  mL/hr at 05/16/23 1245 100 mL/hr at 05/16/23 1245    ticagrelor (BRILINTA) tablet 90 mg  90 mg Oral BID Taco Castañeda, DO

## 2023-05-16 NOTE — PLAN OF CARE
Goal Outcome Evaluation:           Progress: improving  Outcome Evaluation: Patient had a heart cath today per Dr. Castañeda. Stent to LAD, right groin c/d/i. Pulses palpable. VSS/RA. Sinus 66-76 per tele. Continue monitoring and possible home tomorrow.

## 2023-05-16 NOTE — OP NOTE
Full note to follow shortly.  There was a 80 to 90% stenotic lesion in the proximal LAD with thrombus material present.  Patient underwent PTCA and stent placement.  He will need to be on dual antiplatelet therapy with aspirin and Brilinta for a minimum of 1 year.              [facility]  Date of procedure: 5/16/2023     Procedures performed:     1.  Access to the right femoral artery via modified Seldinger technique  2.  Left heart catheterization with retrograde crossing the aortic valve into the left ventricle  3.  LV ventriculography  4.  Selective bilateral coronary angiography  5.  Conscious sedation with continuous hemodynamic monitoring for 15 minutes  6.  Successful PTCA to the proximal LAD with a Young Quantum NC 4 x 12 mm balloon times multiple inflations  7.  Successful PCI to the proximal LAD with a Xience Skypoint 4.5 x 18 mm drug-eluting stent  8.  Successful NC PTCA to the proximal LAD with a trek NC Rx 5 x 12 mm balloon times multiple inflations  9.  Successful NC PTCA to the proximal LAD with a trek NC Rx 5 x 8 mm balloon times multiple inflations  10.  Selective right iliofemoral angiography  11.  Patent hemostasis achieved in the right femoral artery access site using a Perclose          Risk, Benefits, and Alternatives discussed with the patient and/or family.  Plan is for moderate sedation and the patient agrees to proceed with the procedure.  An immediate assessment was done prior to the administration of moderate sedation     Indication: NSTEMI  Premedication: Versed, fentanyl  Contrast: Isovue 232 cc  Radiation: Flouro time= 6.2 knits. Air Kerma= 1348 mGy  Catheters: 6Fr JL4, 6Fr JR4, 6Fr angled pigtail  Guiding catheter: XB 3.5    PCI hardware: BMW wire, Xience Skypoint 4.5 x 18 mm drug-eluting stent, Quantum NC MR 4 x 12 mm balloon, trek NC Rx 5 x 12 mm balloon, trek NC Rx 5 x 8 mm balloon    Procedural details:  The patient was brought to the cath lab and prepped  and draped in the usual fashion.  Access was obtained in the right femoral artery using a modified Seldinger technique.  A 6 [place] sheath was placed into the artery and flushed.  Next, a JL 4 catheter was inserted and used to engage the left and selective left coronary angiography was performed in multiple views.  Next, a JR4 catheter was inserted and used to engage the right and selective right coronary angiography was performed in multiple views.  Next, pigtail catheter was inserted and used to cross the aortic valve to the left ventricle where pressures were recorded and LV ventriculography was performed.  This catheter was then withdrawn back cross the aortic valve and again pressures were recorded.  Next, an XB 3.5 catheter was inserted and used to engage the left main.  A BMW wire was inserted and advanced into the distal LAD.  We then inserted a Quantum apex 4 x 12 mm balloon into the proximal LAD where it was inflated multiple times.  We then inserted a Xience Skypoint 4.5 x 18 mm drug-eluting stent into the proximal LAD where was deployed at 16 guillermo for 40 seconds.  We then inserted a trek NC Rx 5 x 12 mm balloon into the proximal LAD where was inflated multiple times.  We then inserted a trek NC Rx 5 x 8 mm balloon into the proximal LAD where was inflated multiple times.  Postintervention angiography was performed in multiple views.  Everything was then withdrawn through the sheath and the sheath was flushed.  Patent hemostasis was achieved in the right femoral artery access site using a Perclose closure device.  Patient tolerated the procedure well without any complications.  He left the Cath Lab in stable condition.      I supervised the administration of conscious sedation by nursing staff throughout the case.  First dose was given at 1031 and the end of my face-to-face encounter was at 1122, accounting for a total of 50 minutes of supervision.  During the case, continuous pulse oximetry, heart rate,  blood pressure, and patient status were monitored.     Findings:    Hemodynamics:    Aortic: 147/86 mmHg  LV: 165/1 mmHg  LVEDP: 15 mmHg    Left ventriculography:  1. The contractility of the left ventricle is normal.  Estimated ejection fraction 50% with some mild anterior apical and apical hypokinesis noted  2.  No significant gradient on pullback across the aortic valve      Selective coronary angiography:     Left main: Left main is a large-caliber vessel that bifurcates into the LAD and left circumflex coronary arteries, no angiographic evidence of stenosis, MADELIN-3 flow    LAD: The LAD is a large-caliber vessel that has 90% stenosis in the proximal segment, the remainder of the vessel has mild luminal irregularities, MADELIN-3 flow.  Status post successful PTCA and stent placement we had continuation MADELIN-3 flow and no residual stenosis remaining in the proximal segment.    Diagonals: First diagonal is large caliber with multiple branches and mild disease, the second diagonal is small to moderate caliber with no significant disease    Left circumflex: Left circumflex is a large-caliber vessel with no angiographic evidence of stenosis, MADELIN-3 flow    Obtuse marginals: There is 1 moderate caliber obtuse marginal branch with no significant disease    RCA: The RCA is a large-caliber, dominant vessel that has aneurysmal segments present in the proximal and mid vessel, there is approximately 10 to 20% stenosis in the distal segment, MADELIN-3 flow    PDA/YENI: Both of these are moderate caliber with mild disease      Estimated Blood Loss: Minimal    Specimens: None    Complications: None       Impression:  1.  Coronary artery disease as described above including a significant lesion in the proximal LAD that underwent successful PTCA and stent placement  2.  Subclavian artery disease with thrombus present  3.  Hyperlipidemia  4.  Hypertension         Plan:   1.  Turn to the floor monitor closely overnight.  Possible  discharge home tomorrow morning if no problems overnight  2.  High intensity statin with Lipitor 40  3.  Resume Coreg, lisinopril home doses  4.  Dual antiplatelet therapy for a minimum of 1 year with aspirin 81 and [place] 90 twice daily  5.  Close follow-up with [facility] cardiology as an outpatient  6.  Referral for cardiac rehab        Taco Castañeda, DO  Interventional cardiology  [facility]  May 16, 2023

## 2023-05-16 NOTE — PLAN OF CARE
Goal Outcome Evaluation:              Outcome Evaluation: Pt admitted to 4b from the ED for NSTEMI. No c/o chest pain since arriving on 4b, but has c/o 2/10 nonradiating abdominal pain. Pt has slept most of the night. Pt initially hypertensive, but bp has since normalized. NPO. SpO2 94-96 on room air. Sinus 63-71 with PVCs.

## 2023-05-16 NOTE — ED NOTES
Report given to amy MERIDA RN.      Nursing report ED to floor  Lm Nguyễn  67 y.o.  male    HPI:   Chief Complaint   Patient presents with    Chest Pain     Pt states he woke up around midnight with CP and pain that radiates down left arm. Pt saw Dr. Busch today and was told to  come to the ER because his EKG was abnormal       Admitting doctor:   Dwayne Mims MD    Consulting provider(s):  Consults       No orders found from 4/16/2023 to 5/16/2023.             Admitting diagnosis:   The primary encounter diagnosis was Acute chest pain. A diagnosis of Elevated troponin was also pertinent to this visit.    Code status:   Current Code Status       Date Active Code Status Order ID Comments User Context       Not on file            Allergies:   Patient has no known allergies.    Intake and Output  No intake or output data in the 24 hours ending 05/15/23 2316    Weight:       05/15/23  1627   Weight: 107 kg (236 lb)       Most recent vitals:   Vitals:    05/15/23 2146 05/15/23 2201 05/15/23 2231 05/15/23 2301   BP: 162/84 159/80 156/84 157/84   BP Location:       Patient Position:       Pulse: 78 76 70 71   Resp: 18      Temp:       TempSrc:       SpO2: 94% 95% 94% 94%   Weight:       Height:         Oxygen Therapy: .    Active LDAs/IV Access:   Lines, Drains & Airways       Active LDAs       Name Placement date Placement time Site Days    Peripheral IV 05/15/23 1929 Right Antecubital 05/15/23  1929  Antecubital  less than 1                    Labs (abnormal labs have a star):   Labs Reviewed   COMPREHENSIVE METABOLIC PANEL - Abnormal; Notable for the following components:       Result Value    AST (SGOT) 169 (*)     All other components within normal limits    Narrative:     GFR Normal >60  Chronic Kidney Disease <60  Kidney Failure <15     TROPONIN - Abnormal; Notable for the following components:    HS Troponin T 2,539 (*)     All other components within normal limits    Narrative:     High Sensitive Troponin T  Reference Range:  <10.0 ng/L- Negative Female for AMI  <15.0 ng/L- Negative Male for AMI  >=10 - Abnormal Female indicating possible myocardial injury.  >=15 - Abnormal Male indicating possible myocardial injury.   Clinicians would have to utilize clinical acumen, EKG, Troponin, and serial changes to determine if it is an Acute Myocardial Infarction or myocardial injury due to an underlying chronic condition.        CBC WITH AUTO DIFFERENTIAL - Abnormal; Notable for the following components:    WBC 14.77 (*)     Neutrophils, Absolute 10.35 (*)     Monocytes, Absolute 1.14 (*)     All other components within normal limits   BNP (IN-HOUSE) - Abnormal; Notable for the following components:    proBNP 1,351.0 (*)     All other components within normal limits    Narrative:     Among patients with dyspnea, NT-proBNP is highly sensitive for the detection of acute congestive heart failure. In addition NT-proBNP of <300 pg/ml effectively rules out acute congestive heart failure with 99% negative predictive value.    Results may be falsely decreased if patient taking Biotin.     D-DIMER, QUANTITATIVE - Abnormal; Notable for the following components:    D-Dimer, Quantitative 1.91 (*)     All other components within normal limits    Narrative:     According to the assay 's published package insert, a normal (<0.50 MCGFEU/mL) D-dimer result in conjunction with a non-high clinical probability assessment, excludes deep vein thrombosis (DVT) and pulmonary embolism (PE) with high sensitivity.    D-dimer values increase with age and this can make VTE exclusion of an older population difficult. To address this, the American College of [employer], based on best available evidence and recent guidelines, recommends that clinicians use age-adjusted D-dimer thresholds in patients greater than 50 years of age with: a) a low probability of PE who do not meet all Pulmonary Embolism Rule Out Criteria, or b) in those with  "intermediate probability of PE.   The formula for an age-adjusted D-dimer cut-off is \"age/100\".  For example, a 60 year old patient would have an age-adjusted cut-off of 0.60 MCGFEU/mL and an 80 year old 0.80 MCGFEU/mL.   HIGH SENSITIVITIY TROPONIN T 2HR - Abnormal; Notable for the following components:    HS Troponin T 2,418 (*)     Troponin T Delta -121 (*)     All other components within normal limits    Narrative:     High Sensitive Troponin T Reference Range:  <10.0 ng/L- Negative Female for AMI  <15.0 ng/L- Negative Male for AMI  >=10 - Abnormal Female indicating possible myocardial injury.  >=15 - Abnormal Male indicating possible myocardial injury.   Clinicians would have to utilize clinical acumen, EKG, Troponin, and serial changes to determine if it is an Acute Myocardial Infarction or myocardial injury due to an underlying chronic condition.        LIPASE - Normal   MAGNESIUM - Normal   RAINBOW DRAW    Narrative:     The following orders were created for panel order [place] Draw.  Procedure                               Abnormality         Status                     ---------                               -----------         ------                     Green Top (Gel)[[id number]]                                  Final result               Lavender Top[[id number]]                                     Final result               Red Top[[id number]]                                          Final result               Light Blue Top[[id number]]                                   Final result                 Please view results for these tests on the individual orders.   BASIC METABOLIC PANEL   CBC (NO DIFF)   LIPID PANEL   HEMOGLOBIN A1C   CBC AND DIFFERENTIAL    Narrative:     The following orders were created for panel order CBC & Differential.  Procedure                               Abnormality         Status                     ---------                               -----------         ------                   "   CBC Auto Differential[[id number]]        Abnormal            Final result                 Please view results for these tests on the individual orders.   GREEN TOP   LAVENDER TOP   RED TOP   LIGHT BLUE TOP       Meds given in ED:   Medications   sodium chloride 0.9 % flush 10 mL (has no administration in time range)   aspirin chewable tablet 324 mg (324 mg Oral Not Given 5/15/23 1915)   Enoxaparin Sodium (LOVENOX) syringe 105 mg (has no administration in time range)   sodium chloride 0.9 % flush 10 mL (has no administration in time range)   sodium chloride 0.9 % flush 10 mL (has no administration in time range)   sodium chloride 0.9 % infusion 40 mL (has no administration in time range)   aspirin EC tablet 81 mg (has no administration in time range)   carvedilol (COREG) tablet 3.125 mg (has no administration in time range)   lisinopril (PRINIVIL,ZESTRIL) tablet 5 mg (has no administration in time range)   atorvastatin (LIPITOR) tablet 40 mg (has no administration in time range)   nitroglycerin (NITROSTAT) SL tablet 0.4 mg (has no administration in time range)   labetalol (NORMODYNE,TRANDATE) injection 20 mg (20 mg Intravenous Given 5/15/23 1933)   famotidine (PEPCID) injection 20 mg (20 mg Intravenous Given 5/15/23 1934)   iopamidol (ISOVUE-370) 76 % injection 100 mL (100 mL Intravenous Given 5/15/23 2048)           NIH Stroke Scale:       Isolation/Infection(s):  No active isolations   No active infections     COVID Testing  Collected .  Resulted .    Nursing report ED to floor:  Mental status: .  Ambulatory status: .  Precautions: .    ED nurse phone extentsion- ..

## 2023-05-16 NOTE — CASE MANAGEMENT/SOCIAL WORK
Discharge Planning Assessment  [facility]     Patient Name: Lm Nguyễn  MRN: [id number]  Today's Date: 5/16/2023    Admit Date: 5/15/2023    Plan: Spoke with patient and spouse at bedside for dc planning. Patient independent prior to admit. Does not use dme or outside services. Has pcp and [employer] for insurance. Denies dc needs.   Discharge Needs Assessment       Row Name 05/16/23 1336       Living Environment    People in Home spouse    Current Living Arrangements home    Primary Care Provided by [facility]    Quality of Family Relationships supportive    Able to Return to Prior Arrangements yes       Transition Planning    Patient/Family Anticipates Transition to home    Patient/Family Anticipated Services at Transition none       Discharge Needs Assessment    Equipment Currently Used at Home none                   Discharge Plan       Row Name 05/16/23 9840       Plan    Plan Spoke with patient and spouse at bedside for dc planning. Patient independent prior to admit. Does not use dme or outside services. Has pcp and [employer] Medicare for insurance. Denies dc needs.                  Continued Care and Services - Admitted Since 5/15/2023    Coordination has not been started for this encounter.          Demographic Summary    No documentation.                  Functional Status    No documentation.                  Psychosocial    No documentation.                  Abuse/Neglect    No documentation.                  Legal    No documentation.                  Substance Abuse    No documentation.                  Patient Forms    No documentation.                     Merlina A Fletcher, RN

## 2023-05-16 NOTE — H&P
[facility] -  History and Physical     LOS: 1 day   Patient Care Team:  Provider, No Known as PCP - General    Chief Complaint: Chest Pain     Subjective     Interval History:   Patient presents to the ER for chest pain. He notes this pain awoke him from his sleep. He notes he has been feeling fine leading up to this pain. He notes that 3-4 months ago he had an intense pain in his abdomen that he thought he might die for- but this has resolved. He is active and has not been limited. Denies fever. He notes last year he established with Dr. Busch. He takes no medications- notes he has no medical problems. He notes he has a family history of coronary artery disease. He smoked for roughly 10 years but quit 40 + years ago. In the ER he was found to have Elevated Troponin of 2,539 with ST changes on EKG. His LDL was found to be 206 and total 266. A1C 5.8. His WBC was 14 that trended down. Denies fever, cough or hematuria. He had an abnormal D-dimer and a CTA of chest was ordered. CTA showed no pulmonary embolism. He was noted to have mild plaquing and mural thrombus with mild stenosis of proximal segment of subclavian artery. Noted to have cardiomegaly with with coronary calcifications with elevated right heart pressure. Patient's blood pressure was elevated but has responded to Coreg and Lisinopril.     Review of Systems:     Review of Systems   Constitutional:  Negative for activity change, appetite change, chills, fatigue, fever and unexpected weight change.   HENT:  Negative for congestion, dental problem and trouble swallowing.    Eyes:  Negative for visual disturbance.   Respiratory:  Negative for cough, chest tightness and shortness of breath.    Cardiovascular:  Positive for chest pain. Negative for palpitations and leg swelling.   Gastrointestinal:  Negative for abdominal pain, diarrhea, nausea and vomiting.   Endocrine: Negative for cold intolerance and heat intolerance.   Genitourinary:   Negative for dysuria.   Musculoskeletal:  Negative for back pain and neck pain.   Skin:  Negative for color change and wound.   Allergic/Immunologic: Negative for immunocompromised state.   Neurological:  Negative for dizziness and light-headedness.   Hematological:  Negative for adenopathy. Does not bruise/bleed easily.   Psychiatric/Behavioral:  Negative for agitation.      History  History reviewed. No pertinent past medical history.  History reviewed. No pertinent surgical history.  Family History   Problem Relation Age of Onset    Hyperlipidemia Mother     Heart disease Maternal Grandfather      Social History     Tobacco Use    Smoking status: Never    Smokeless tobacco: Never   Vaping Use    Vaping Use: Never used   Substance Use Topics    Alcohol use: Never    Drug use: Never     No medications prior to admission.     Allergies:  Patient has no known allergies.    Objective     Vital Sign Min/Max for last 24 hours  Temp  Min: 98.7 °F (37.1 °C)  Max: 100 °F (37.8 °C)   BP  Min: 121/71  Max: 198/94   Pulse  Min: 70  Max: 83   Resp  Min: 16  Max: 18   SpO2  Min: 94 %  Max: 98 %   No data recorded   Weight  Min: 105 kg (231 lb 6.4 oz)  Max: 107 kg (236 lb)         05/15/23  2340   Weight: 105 kg (231 lb 6.4 oz)       Physical Exam:    Constitutional:       Appearance: Healthy appearance. Well-developed and not in distress.   Eyes:      Pupils: Pupils are equal, round, and reactive to light.   HENT:      Head: Normocephalic and atraumatic.    Mouth/Throat:      Pharynx: Oropharynx is clear.   Neck:      Vascular: No carotid bruit or JVD.   Pulmonary:      Effort: Pulmonary effort is normal.      Breath sounds: Normal breath sounds.   Cardiovascular:      Normal rate. Regular rhythm.   Pulses:     Intact distal pulses.   Edema:     Peripheral edema absent.   Abdominal:      General: Bowel sounds are normal.      Palpations: Abdomen is soft.   Musculoskeletal: Normal range of motion.      Cervical back: Normal  range of motion and neck supple. Skin:     General: Skin is warm and dry.   Neurological:      Mental Status: Alert and oriented to person, place, and time.      Deep Tendon Reflexes: Reflexes are normal and symmetric.   Psychiatric:         Behavior: Behavior normal.         Thought Content: Thought content normal.         Judgment: Judgment normal.     Results Review:   Lab Results (last 72 hours)       Procedure Component Value Units Date/Time    Basic Metabolic Panel [[id number]]  (Abnormal) Collected: 05/16/23 0415    Specimen: Blood Updated: 05/16/23 0457     Glucose 127 mg/dL      BUN 12 mg/dL      Creatinine 1.00 mg/dL      Sodium 137 mmol/L      Potassium 3.6 mmol/L      Chloride 101 mmol/L      CO2 24.0 mmol/L      Calcium 9.6 mg/dL      BUN/Creatinine Ratio 12.0     Anion Gap 12.0 mmol/L      eGFR 83.0 mL/min/1.73     Narrative:      GFR Normal >60  Chronic Kidney Disease <60  Kidney Failure <15      Lipid Panel [[id number]]  (Abnormal) Collected: 05/16/23 0415    Specimen: Blood Updated: 05/16/23 0457     Total Cholesterol 266 mg/dL      Triglycerides 146 mg/dL      HDL Cholesterol 33 mg/dL      LDL Cholesterol  206 mg/dL      VLDL Cholesterol 27 mg/dL      LDL/HDL Ratio 6.18    Narrative:      Cholesterol Reference Ranges  (U.S. Department of Health and Human Services ATP III Classifications)    Desirable          <200 mg/dL  Borderline High    200-239 mg/dL  High Risk          >240 mg/dL      Triglyceride Reference Ranges  (U.S. Department of Health and Human Services ATP III Classifications)    Normal           <150 mg/dL  Borderline High  150-199 mg/dL  High             200-499 mg/dL  Very High        >500 mg/dL    HDL Reference Ranges  (U.S. Department of Health and Human Services ATP III Classifications)    Low     <40 mg/dl (major risk factor for CHD)  High    >60 mg/dl ('negative' risk factor for CHD)        LDL Reference Ranges  (U.S. Department of Health and Human Services ATP III  Classifications)    Optimal          <100 mg/dL  Near Optimal     100-129 mg/dL  Borderline High  130-159 mg/dL  High             160-189 mg/dL  Very High        >189 mg/dL    Hemoglobin A1c [[id number]]  (Abnormal) Collected: 05/16/23 0415    Specimen: Blood Updated: 05/16/23 0442     Hemoglobin A1C 5.80 %     Narrative:      Hemoglobin A1C Ranges:    Increased Risk for Diabetes  5.7% to 6.4%  Diabetes                     >= 6.5%  Diabetic Goal                < 7.0%    CBC (No Diff) [[id number]]  (Abnormal) Collected: 05/16/23 0415    Specimen: Blood Updated: 05/16/23 0434     WBC 11.52 10*3/mm3      RBC 5.10 10*6/mm3      Hemoglobin 15.1 g/dL      Hematocrit 45.4 %      MCV 89.0 fL      MCH 29.6 pg      MCHC 33.3 g/dL      RDW 12.8 %      RDW-SD 41.8 fl      MPV 10.5 fL      Platelets 243 10*3/mm3     High Sensitivity Troponin T 2Hr [[id number]]  (Abnormal) Collected: 05/15/23 2201    Specimen: Blood Updated: 05/15/23 2239     HS Troponin T 2,418 ng/L      Troponin T Delta -121 ng/L     Narrative:      High Sensitive Troponin T Reference Range:  <10.0 ng/L- Negative Female for AMI  <15.0 ng/L- Negative Male for AMI  >=10 - Abnormal Female indicating possible myocardial injury.  >=15 - Abnormal Male indicating possible myocardial injury.   Clinicians would have to utilize clinical acumen, EKG, Troponin, and serial changes to determine if it is an Acute Myocardial Infarction or myocardial injury due to an underlying chronic condition.         [place] Draw [[id number]] Collected: 05/15/23 1928    Specimen: Blood Updated: 05/15/23 2031    Narrative:      The following orders were created for panel order [place] Draw.  Procedure                               Abnormality         Status                     ---------                               -----------         ------                     Green Top (Gel)[[id number]]                                  Final result               Lavender Top[[id number]]                                      Final result               Red Top[[id number]]                                          Final result               Light Blue Top[[id number]]                                   Final result                 Please view results for these tests on the individual orders.    Lavender Top [[id number]] Collected: 05/15/23 1928    Specimen: Blood Updated: 05/15/23 2031     Extra Tube hold for add-on     Comment: Auto resulted       Red Top [[id number]] Collected: 05/15/23 1928    Specimen: Blood Updated: 05/15/23 2031     Extra Tube Hold for add-ons.     Comment: Auto resulted.       Light Blue Top [[id number]] Collected: 05/15/23 1928    Specimen: Blood Updated: 05/15/23 2031     Extra Tube Hold for add-ons.     Comment: Auto resulted       Green Top (Gel) [[id number]] Collected: 05/15/23 1928    Specimen: Blood Updated: 05/15/23 2031     Extra Tube Hold for add-ons.     Comment: Auto resulted.       Magnesium [[id number]]  (Normal) Collected: 05/15/23 1928    Specimen: Blood Updated: 05/15/23 2006     Magnesium 1.9 mg/dL     Comprehensive Metabolic Panel [[id number]]  (Abnormal) Collected: 05/15/23 1928    Specimen: Blood Updated: 05/15/23 2006     Glucose 98 mg/dL      BUN 10 mg/dL      Creatinine 0.91 mg/dL      Sodium 139 mmol/L      Potassium 3.5 mmol/L      Chloride 100 mmol/L      CO2 26.0 mmol/L      Calcium 9.8 mg/dL      Total Protein 7.8 g/dL      Albumin 4.8 g/dL      ALT (SGPT) 35 U/L      AST (SGOT) 169 U/L      Alkaline Phosphatase 71 U/L      Total Bilirubin 0.6 mg/dL      Globulin 3.0 gm/dL      A/G Ratio 1.6 g/dL      BUN/Creatinine Ratio 11.0     Anion Gap 13.0 mmol/L      eGFR 93.0 mL/min/1.73     Narrative:      GFR Normal >60  Chronic Kidney Disease <60  Kidney Failure <15      High Sensitivity Troponin T [[id number]]  (Abnormal) Collected: 05/15/23 1928    Specimen: Blood Updated: 05/15/23 2006     HS Troponin T 2,539 ng/L     Narrative:      High Sensitive Troponin T Reference Range:  <10.0 ng/L-  "Negative Female for AMI  <15.0 ng/L- Negative Male for AMI  >=10 - Abnormal Female indicating possible myocardial injury.  >=15 - Abnormal Male indicating possible myocardial injury.   Clinicians would have to utilize clinical acumen, EKG, Troponin, and serial changes to determine if it is an Acute Myocardial Infarction or myocardial injury due to an underlying chronic condition.         Lipase [[id number]]  (Normal) Collected: 05/15/23 1928    Specimen: Blood Updated: 05/15/23 2001     Lipase 16 U/L     BNP [[id number]]  (Abnormal) Collected: 05/15/23 1928    Specimen: Blood Updated: 05/15/23 2001     proBNP 1,351.0 pg/mL     Narrative:      Among patients with dyspnea, NT-proBNP is highly sensitive for the detection of acute congestive heart failure. In addition NT-proBNP of <300 pg/ml effectively rules out acute congestive heart failure with 99% negative predictive value.    Results may be falsely decreased if patient taking Biotin.      D-dimer, Quantitative [[id number]]  (Abnormal) Collected: 05/15/23 1928    Specimen: Blood Updated: 05/15/23 1955     D-Dimer, Quantitative 1.91 MCGFEU/mL     Narrative:      According to the assay 's published package insert, a normal (<0.50 MCGFEU/mL) D-dimer result in conjunction with a non-high clinical probability assessment, excludes deep vein thrombosis (DVT) and pulmonary embolism (PE) with high sensitivity.    D-dimer values increase with age and this can make VTE exclusion of an older population difficult. To address this, the [employer], based on best available evidence and recent guidelines, recommends that clinicians use age-adjusted D-dimer thresholds in patients greater than 50 years of age with: a) a low probability of PE who do not meet all Pulmonary Embolism Rule Out Criteria, or b) in those with intermediate probability of PE.   The formula for an age-adjusted D-dimer cut-off is \"age/100\".  For example, a 60 year old patient would " have an age-adjusted cut-off of 0.60 MCGFEU/mL and an 80 year old 0.80 MCGFEU/mL.    CBC & Differential [[id number]]  (Abnormal) Collected: 05/15/23 1928    Specimen: Blood Updated: 05/15/23 1940    Narrative:      The following orders were created for panel order CBC & Differential.  Procedure                               Abnormality         Status                     ---------                               -----------         ------                     CBC Auto Differential[[id number]]        Abnormal            Final result                 Please view results for these tests on the individual orders.    CBC Auto Differential [[id number]]  (Abnormal) Collected: 05/15/23 1928    Specimen: Blood Updated: 05/15/23 1940     WBC 14.77 10*3/mm3      RBC 5.28 10*6/mm3      Hemoglobin 15.5 g/dL      Hematocrit 46.5 %      MCV 88.1 fL      MCH 29.4 pg      MCHC 33.3 g/dL      RDW 12.9 %      RDW-SD 41.1 fl      MPV 10.4 fL      Platelets 246 10*3/mm3      Neutrophil % 70.1 %      Lymphocyte % 20.8 %      Monocyte % 7.7 %      Eosinophil % 0.7 %      Basophil % 0.4 %      Immature Grans % 0.3 %      Neutrophils, Absolute 10.35 10*3/mm3      Lymphocytes, Absolute 3.07 10*3/mm3      Monocytes, Absolute 1.14 10*3/mm3      Eosinophils, Absolute 0.11 10*3/mm3      Basophils, Absolute 0.06 10*3/mm3      Immature Grans, Absolute 0.04 10*3/mm3      nRBC 0.0 /100 WBC           Medication Review: yes  Current Facility-Administered Medications   Medication Dose Route Frequency Provider Last Rate Last Admin    aspirin chewable tablet 324 mg  324 mg Oral Once Dwayne Mims MD        aspirin EC tablet 81 mg  81 mg Oral Daily Dwayne Mims MD        atorvastatin (LIPITOR) tablet 40 mg  40 mg Oral Nightly Dwayne Mims MD   40 mg at 05/16/23 0043    carvedilol (COREG) tablet 3.125 mg  3.125 mg Oral BID With Meals Dwayne Mims MD   3.125 mg at 05/16/23 0043    lisinopril (PRINIVIL,ZESTRIL) tablet 5 mg  5 mg Oral Daily [place],  Dwayne SOLER MD        nitroglycerin (NITROSTAT) SL tablet 0.4 mg  0.4 mg Sublingual Q5 Min PRN Dwayne Mims MD        sodium chloride 0.9 % flush 10 mL  10 mL Intravenous PRN Dwayne Mims MD        sodium chloride 0.9 % flush 10 mL  10 mL Intravenous Q12H Dwayne Mims MD   10 mL at 05/16/23 0044    sodium chloride 0.9 % flush 10 mL  10 mL Intravenous PRN Dwayne Mims MD        sodium chloride 0.9 % infusion 40 mL  40 mL Intravenous PRN Dwayne Mims MD             Assessment & Plan       NSTEMI (non-ST elevated myocardial infarction)    Acute chest pain    Subclavian artery disease    Pure hypercholesterolemia    Elevated blood pressure reading    Plan:  NSTEMI - with acute chest pain. Now Pain free. Will plan for heart cath today. Given 325 Aspirin in the ER and started on 81 mg daily. Started on Lipitor, Coreg and Lisinopril. Given full dose Lovenox at 0034. Will plan for heart cath today. Echo ordered.      Subclavian Stenosis with Mural Thrombus - given full dose Lovenox. Needs heart cath today for NSTEMI. Consult Vascular surgery.     Pure hypercholesterolemia- patient denies a history. Started on Lipitor. Goal LDL less than 70.     Elevated Blood Pressure- much improved on Coreg and Lisinopril. No previous diagnosis.     Electronically signed by OPAL Lambert, 05/16/23, 8:40 AM CDT.

## 2023-05-17 ENCOUNTER — APPOINTMENT (OUTPATIENT)
Dept: ULTRASOUND IMAGING | Facility: HOSPITAL | Age: 68
End: 2023-05-17
Payer: MEDICARE

## 2023-05-17 ENCOUNTER — READMISSION MANAGEMENT (OUTPATIENT)
Dept: CALL CENTER | Facility: HOSPITAL | Age: 68
End: 2023-05-17
Payer: MEDICARE

## 2023-05-17 ENCOUNTER — APPOINTMENT (OUTPATIENT)
Dept: CARDIOLOGY | Facility: HOSPITAL | Age: 68
End: 2023-05-17
Payer: MEDICARE

## 2023-05-17 VITALS
RESPIRATION RATE: 16 BRPM | SYSTOLIC BLOOD PRESSURE: 131 MMHG | TEMPERATURE: 98.9 F | WEIGHT: 229.6 LBS | HEIGHT: 71 IN | OXYGEN SATURATION: 95 % | HEART RATE: 71 BPM | BODY MASS INDEX: 32.14 KG/M2 | DIASTOLIC BLOOD PRESSURE: 63 MMHG

## 2023-05-17 DIAGNOSIS — Z95.5 S/P DRUG ELUTING CORONARY STENT PLACEMENT: Primary | ICD-10-CM

## 2023-05-17 PROBLEM — I25.5 ISCHEMIC CARDIOMYOPATHY: Status: ACTIVE | Noted: 2023-05-17

## 2023-05-17 PROBLEM — I10 ESSENTIAL HYPERTENSION: Status: ACTIVE | Noted: 2023-05-17

## 2023-05-17 LAB
ANION GAP SERPL CALCULATED.3IONS-SCNC: 13 MMOL/L (ref 5–15)
BH CV ECHO MEAS - AO MAX PG: 7.3 MMHG
BH CV ECHO MEAS - AO MEAN PG: 4 MMHG
BH CV ECHO MEAS - AO ROOT DIAM: 3.2 CM
BH CV ECHO MEAS - AO V2 MAX: 135 CM/SEC
BH CV ECHO MEAS - AO V2 VTI: 25.8 CM
BH CV ECHO MEAS - AVA(I,D): 2.7 CM2
BH CV ECHO MEAS - EDV(CUBED): 163.7 ML
BH CV ECHO MEAS - EDV(MOD-SP4): 199 ML
BH CV ECHO MEAS - EF(MOD-SP4): 40.2 %
BH CV ECHO MEAS - ESV(CUBED): 85.8 ML
BH CV ECHO MEAS - ESV(MOD-SP4): 119 ML
BH CV ECHO MEAS - FS: 19.4 %
BH CV ECHO MEAS - IVS/LVPW: 0.85 CM
BH CV ECHO MEAS - IVSD: 0.95 CM
BH CV ECHO MEAS - LA DIMENSION: 3.8 CM
BH CV ECHO MEAS - LAT PEAK E' VEL: 10.3 CM/SEC
BH CV ECHO MEAS - LV DIASTOLIC VOL/BSA (35-75): 89.1 CM2
BH CV ECHO MEAS - LV MASS(C)D: 221.5 GRAMS
BH CV ECHO MEAS - LV MAX PG: 4.2 MMHG
BH CV ECHO MEAS - LV MEAN PG: 2 MMHG
BH CV ECHO MEAS - LV SYSTOLIC VOL/BSA (12-30): 53.3 CM2
BH CV ECHO MEAS - LV V1 MAX: 103 CM/SEC
BH CV ECHO MEAS - LV V1 VTI: 18.1 CM
BH CV ECHO MEAS - LVIDD: 5.5 CM
BH CV ECHO MEAS - LVIDS: 4.4 CM
BH CV ECHO MEAS - LVOT AREA: 3.8 CM2
BH CV ECHO MEAS - LVOT DIAM: 2.2 CM
BH CV ECHO MEAS - LVPWD: 1.12 CM
BH CV ECHO MEAS - MED PEAK E' VEL: 3.8 CM/SEC
BH CV ECHO MEAS - MV A MAX VEL: 85.3 CM/SEC
BH CV ECHO MEAS - MV DEC TIME: 0.2 MSEC
BH CV ECHO MEAS - MV E MAX VEL: 48.8 CM/SEC
BH CV ECHO MEAS - MV E/A: 0.57
BH CV ECHO MEAS - RAP SYSTOLE: 5 MMHG
BH CV ECHO MEAS - RVSP: 25.6 MMHG
BH CV ECHO MEAS - SI(MOD-SP4): 35.8 ML/M2
BH CV ECHO MEAS - SV(LVOT): 68.8 ML
BH CV ECHO MEAS - SV(MOD-SP4): 80 ML
BH CV ECHO MEAS - TR MAX PG: 20.6 MMHG
BH CV ECHO MEAS - TR MAX VEL: 227 CM/SEC
BH CV ECHO MEASUREMENTS AVERAGE E/E' RATIO: 6.92
BUN SERPL-MCNC: 14 MG/DL (ref 8–23)
BUN/CREAT SERPL: 15.2 (ref 7–25)
CALCIUM SPEC-SCNC: 8.9 MG/DL (ref 8.6–10.5)
CHLORIDE SERPL-SCNC: 104 MMOL/L (ref 98–107)
CO2 SERPL-SCNC: 21 MMOL/L (ref 22–29)
CREAT SERPL-MCNC: 0.92 MG/DL (ref 0.76–1.27)
DEPRECATED RDW RBC AUTO: 42.4 FL (ref 37–54)
EGFRCR SERPLBLD CKD-EPI 2021: 91.7 ML/MIN/1.73
ERYTHROCYTE [DISTWIDTH] IN BLOOD BY AUTOMATED COUNT: 13 % (ref 12.3–15.4)
GLUCOSE SERPL-MCNC: 113 MG/DL (ref 65–99)
HCT VFR BLD AUTO: 43.5 % (ref 37.5–51)
HGB BLD-MCNC: 14.4 G/DL (ref 13–17.7)
LEFT ATRIUM VOLUME INDEX: 40.6 ML/M2
LEFT ATRIUM VOLUME: 90.6 ML
MCH RBC QN AUTO: 29.4 PG (ref 26.6–33)
MCHC RBC AUTO-ENTMCNC: 33.1 G/DL (ref 31.5–35.7)
MCV RBC AUTO: 88.8 FL (ref 79–97)
PLATELET # BLD AUTO: 211 10*3/MM3 (ref 140–450)
PMV BLD AUTO: 10.5 FL (ref 6–12)
POTASSIUM SERPL-SCNC: 3.7 MMOL/L (ref 3.5–5.2)
QT INTERVAL: 362 MS
QT INTERVAL: 382 MS
QT INTERVAL: 390 MS
QTC INTERVAL: 398 MS
QTC INTERVAL: 412 MS
QTC INTERVAL: 446 MS
RBC # BLD AUTO: 4.9 10*6/MM3 (ref 4.14–5.8)
SODIUM SERPL-SCNC: 138 MMOL/L (ref 136–145)
WBC NRBC COR # BLD: 11.58 10*3/MM3 (ref 3.4–10.8)

## 2023-05-17 PROCEDURE — 93306 TTE W/DOPPLER COMPLETE: CPT

## 2023-05-17 PROCEDURE — 80048 BASIC METABOLIC PNL TOTAL CA: CPT | Performed by: EMERGENCY MEDICINE

## 2023-05-17 PROCEDURE — 93306 TTE W/DOPPLER COMPLETE: CPT | Performed by: EMERGENCY MEDICINE

## 2023-05-17 PROCEDURE — 99239 HOSP IP/OBS DSCHRG MGMT >30: CPT | Performed by: NURSE PRACTITIONER

## 2023-05-17 PROCEDURE — 93880 EXTRACRANIAL BILAT STUDY: CPT

## 2023-05-17 PROCEDURE — 25510000001 PERFLUTREN 6.52 MG/ML SUSPENSION: Performed by: EMERGENCY MEDICINE

## 2023-05-17 PROCEDURE — 85027 COMPLETE CBC AUTOMATED: CPT | Performed by: EMERGENCY MEDICINE

## 2023-05-17 RX ORDER — ATORVASTATIN CALCIUM 40 MG/1
40 TABLET, FILM COATED ORAL NIGHTLY
Qty: 30 TABLET | Refills: 11 | Status: SHIPPED | OUTPATIENT
Start: 2023-05-17

## 2023-05-17 RX ORDER — CARVEDILOL 3.12 MG/1
3.12 TABLET ORAL 2 TIMES DAILY WITH MEALS
Qty: 90 TABLET | Refills: 11 | Status: SHIPPED | OUTPATIENT
Start: 2023-05-17

## 2023-05-17 RX ORDER — LISINOPRIL 5 MG/1
5 TABLET ORAL DAILY
Qty: 30 TABLET | Refills: 11 | Status: SHIPPED | OUTPATIENT
Start: 2023-05-18

## 2023-05-17 RX ORDER — NITROGLYCERIN 0.4 MG/1
0.4 TABLET SUBLINGUAL
Qty: 30 TABLET | Refills: 1 | Status: SHIPPED | OUTPATIENT
Start: 2023-05-17

## 2023-05-17 RX ADMIN — Medication 10 ML: at 08:07

## 2023-05-17 RX ADMIN — TICAGRELOR 90 MG: 90 TABLET ORAL at 08:08

## 2023-05-17 RX ADMIN — PERFLUTREN 9.78 MG: 6.52 INJECTION, SUSPENSION INTRAVENOUS at 14:17

## 2023-05-17 RX ADMIN — CARVEDILOL 3.12 MG: 3.12 TABLET, FILM COATED ORAL at 08:07

## 2023-05-17 RX ADMIN — ASPIRIN 81 MG: 81 TABLET, COATED ORAL at 08:07

## 2023-05-17 RX ADMIN — LISINOPRIL 5 MG: 5 TABLET ORAL at 08:07

## 2023-05-17 NOTE — NURSING NOTE
Tried to call Echo and nobody answered the phone. I left a message to get his echo done that was ordered on the 15th. Awaiting for them to call me back.

## 2023-05-17 NOTE — DISCHARGE SUMMARY
[facility] HEART GROUP DISCHARGE    Date of Discharge:  5/17/2023    Discharge Diagnosis:   NSTEMI (non-ST elevated myocardial infarction)    Acute chest pain    Subclavian artery disease    Pure hypercholesterolemia    Essential hypertension    Ischemic cardiomyopathy - to further clarify ischemic cardiomyopathy with LV dysfunction without heart failure symptoms.       Presenting Problem/History of Present Illness  NSTEMI (non-ST elevated myocardial infarction) [I21.4]  Acute chest pain [R07.9]    HPI per H&P:  Patient presents to the ER for chest pain. He notes this pain awoke him from his sleep. He notes he has been feeling fine leading up to this pain. He notes that 3-4 months ago he had an intense pain in his abdomen that he thought he might die for- but this has resolved. He is active and has not been limited. Denies fever. He notes last year he established with Dr. Busch. He takes no medications- notes he has no medical problems. He notes he has a family history of coronary artery disease. He smoked for roughly 10 years but quit 40 + years ago. In the ER he was found to have Elevated Troponin of 2,539 with ST changes on EKG. His LDL was found to be 206 and total 266. A1C 5.8. His WBC was 14 that trended down. Denies fever, cough or hematuria. He had an abnormal D-dimer and a CTA of chest was ordered. CTA showed no pulmonary embolism. He was noted to have mild plaquing and mural thrombus with mild stenosis of proximal segment of subclavian artery. Noted to have cardiomegaly with with coronary calcifications with elevated right heart pressure. Patient's blood pressure was elevated but has responded to Coreg and Lisinopril.      Hospital Course  Patient is a 67 y.o. male presented 5/15/23 for acute chest pain that awoke him from his sleep. HE was found to have NSTEMI and taken to cath lab. Cath lab showed 90% stenosis of proximal LAD- this was treated with drug eluting stent. He tolerated procedure  well. No complaints or acute issues overnight. No cath site complaints. He was evaluated by vascular who recommended carotid ultrasound and 6 month follow up.     Procedures Performed  Procedure(s):  Left Heart Cath       Consults:   Consults       Date and Time Order Name Status Description    5/16/2023  8:40 AM Inpatient Vascular Surgery Consult Completed             Pertinent Test Results:   Lab Results (last 72 hours)       Procedure Component Value Units Date/Time    Basic Metabolic Panel [[id number]]  (Abnormal) Collected: 05/17/23 0342    Specimen: Blood Updated: 05/17/23 0415     Glucose 113 mg/dL      BUN 14 mg/dL      Creatinine 0.92 mg/dL      Sodium 138 mmol/L      Potassium 3.7 mmol/L      Chloride 104 mmol/L      CO2 21.0 mmol/L      Calcium 8.9 mg/dL      BUN/Creatinine Ratio 15.2     Anion Gap 13.0 mmol/L      eGFR 91.7 mL/min/1.73     Narrative:      GFR Normal >60  Chronic Kidney Disease <60  Kidney Failure <15      CBC (No Diff) [[id number]]  (Abnormal) Collected: 05/17/23 0342    Specimen: Blood Updated: 05/17/23 0356     WBC 11.58 10*3/mm3      RBC 4.90 10*6/mm3      Hemoglobin 14.4 g/dL      Hematocrit 43.5 %      MCV 88.8 fL      MCH 29.4 pg      MCHC 33.1 g/dL      RDW 13.0 %      RDW-SD 42.4 fl      MPV 10.5 fL      Platelets 211 10*3/mm3     POC Activated Clotting Time [[id number]]  (Abnormal) Collected: 05/16/23 1106    Specimen: Blood Updated: 05/16/23 1525     Activated Clotting Time  209 Seconds      Comment: Serial Number: [id number]:  [id number]       Basic Metabolic Panel [[id number]]  (Abnormal) Collected: 05/16/23 0415    Specimen: Blood Updated: 05/16/23 0457     Glucose 127 mg/dL      BUN 12 mg/dL      Creatinine 1.00 mg/dL      Sodium 137 mmol/L      Potassium 3.6 mmol/L      Chloride 101 mmol/L      CO2 24.0 mmol/L      Calcium 9.6 mg/dL      BUN/Creatinine Ratio 12.0     Anion Gap 12.0 mmol/L      eGFR 83.0 mL/min/1.73     Narrative:      GFR Normal >60  Chronic Kidney Disease  <60  Kidney Failure <15      Lipid Panel [[id number]]  (Abnormal) Collected: 05/16/23 0415    Specimen: Blood Updated: 05/16/23 0457     Total Cholesterol 266 mg/dL      Triglycerides 146 mg/dL      HDL Cholesterol 33 mg/dL      LDL Cholesterol  206 mg/dL      VLDL Cholesterol 27 mg/dL      LDL/HDL Ratio 6.18    Narrative:      Cholesterol Reference Ranges  (U.S. Department of Health and Human Services ATP III Classifications)    Desirable          <200 mg/dL  Borderline High    200-239 mg/dL  High Risk          >240 mg/dL      Triglyceride Reference Ranges  (U.S. Department of Health and Human Services ATP III Classifications)    Normal           <150 mg/dL  Borderline High  150-199 mg/dL  High             200-499 mg/dL  Very High        >500 mg/dL    HDL Reference Ranges  (U.S. Department of Health and Human Services ATP III Classifications)    Low     <40 mg/dl (major risk factor for CHD)  High    >60 mg/dl ('negative' risk factor for CHD)        LDL Reference Ranges  (U.S. Department of Health and Human Services ATP III Classifications)    Optimal          <100 mg/dL  Near Optimal     100-129 mg/dL  Borderline High  130-159 mg/dL  High             160-189 mg/dL  Very High        >189 mg/dL    Hemoglobin A1c [[id number]]  (Abnormal) Collected: 05/16/23 0415    Specimen: Blood Updated: 05/16/23 0442     Hemoglobin A1C 5.80 %     Narrative:      Hemoglobin A1C Ranges:    Increased Risk for Diabetes  5.7% to 6.4%  Diabetes                     >= 6.5%  Diabetic Goal                < 7.0%    CBC (No Diff) [[id number]]  (Abnormal) Collected: 05/16/23 0415    Specimen: Blood Updated: 05/16/23 0434     WBC 11.52 10*3/mm3      RBC 5.10 10*6/mm3      Hemoglobin 15.1 g/dL      Hematocrit 45.4 %      MCV 89.0 fL      MCH 29.6 pg      MCHC 33.3 g/dL      RDW 12.8 %      RDW-SD 41.8 fl      MPV 10.5 fL      Platelets 243 10*3/mm3     High Sensitivity Troponin T 2Hr [[id number]]  (Abnormal) Collected: 05/15/23 2201    Specimen:  Blood Updated: 05/15/23 2239     HS Troponin T 2,418 ng/L      Troponin T Delta -121 ng/L     Narrative:      High Sensitive Troponin T Reference Range:  <10.0 ng/L- Negative Female for AMI  <15.0 ng/L- Negative Male for AMI  >=10 - Abnormal Female indicating possible myocardial injury.  >=15 - Abnormal Male indicating possible myocardial injury.   Clinicians would have to utilize clinical acumen, EKG, Troponin, and serial changes to determine if it is an Acute Myocardial Infarction or myocardial injury due to an underlying chronic condition.         [place] Draw [[id number]] Collected: 05/15/23 1928    Specimen: Blood Updated: 05/15/23 2031    Narrative:      The following orders were created for panel order [place] Draw.  Procedure                               Abnormality         Status                     ---------                               -----------         ------                     Green Top (Gel)[[id number]]                                  Final result               Lavender Top[[id number]]                                     Final result               Red Top[[id number]]                                          Final result               Light Blue Top[[id number]]                                   Final result                 Please view results for these tests on the individual orders.    Lavender Top [[id number]] Collected: 05/15/23 1928    Specimen: Blood Updated: 05/15/23 2031     Extra Tube hold for add-on     Comment: Auto resulted       Red Top [[id number]] Collected: 05/15/23 1928    Specimen: Blood Updated: 05/15/23 2031     Extra Tube Hold for add-ons.     Comment: Auto resulted.       Light Blue Top [[id number]] Collected: 05/15/23 1928    Specimen: Blood Updated: 05/15/23 2031     Extra Tube Hold for add-ons.     Comment: Auto resulted       Green Top (Gel) [[id number]] Collected: 05/15/23 1928    Specimen: Blood Updated: 05/15/23 2031     Extra Tube Hold for add-ons.     Comment: Auto resulted.        Magnesium [[id number]]  (Normal) Collected: 05/15/23 1928    Specimen: Blood Updated: 05/15/23 2006     Magnesium 1.9 mg/dL     Comprehensive Metabolic Panel [[id number]]  (Abnormal) Collected: 05/15/23 1928    Specimen: Blood Updated: 05/15/23 2006     Glucose 98 mg/dL      BUN 10 mg/dL      Creatinine 0.91 mg/dL      Sodium 139 mmol/L      Potassium 3.5 mmol/L      Chloride 100 mmol/L      CO2 26.0 mmol/L      Calcium 9.8 mg/dL      Total Protein 7.8 g/dL      Albumin 4.8 g/dL      ALT (SGPT) 35 U/L      AST (SGOT) 169 U/L      Alkaline Phosphatase 71 U/L      Total Bilirubin 0.6 mg/dL      Globulin 3.0 gm/dL      A/G Ratio 1.6 g/dL      BUN/Creatinine Ratio 11.0     Anion Gap 13.0 mmol/L      eGFR 93.0 mL/min/1.73     Narrative:      GFR Normal >60  Chronic Kidney Disease <60  Kidney Failure <15      High Sensitivity Troponin T [[id number]]  (Abnormal) Collected: 05/15/23 1928    Specimen: Blood Updated: 05/15/23 2006     HS Troponin T 2,539 ng/L     Narrative:      High Sensitive Troponin T Reference Range:  <10.0 ng/L- Negative Female for AMI  <15.0 ng/L- Negative Male for AMI  >=10 - Abnormal Female indicating possible myocardial injury.  >=15 - Abnormal Male indicating possible myocardial injury.   Clinicians would have to utilize clinical acumen, EKG, Troponin, and serial changes to determine if it is an Acute Myocardial Infarction or myocardial injury due to an underlying chronic condition.         Lipase [[id number]]  (Normal) Collected: 05/15/23 1928    Specimen: Blood Updated: 05/15/23 2001     Lipase 16 U/L     BNP [[id number]]  (Abnormal) Collected: 05/15/23 1928    Specimen: Blood Updated: 05/15/23 2001     proBNP 1,351.0 pg/mL     Narrative:      Among patients with dyspnea, NT-proBNP is highly sensitive for the detection of acute congestive heart failure. In addition NT-proBNP of <300 pg/ml effectively rules out acute congestive heart failure with 99% negative predictive value.    Results may be  "falsely decreased if patient taking Biotin.      D-dimer, Quantitative [[id number]]  (Abnormal) Collected: 05/15/23 1928    Specimen: Blood Updated: 05/15/23 1955     D-Dimer, Quantitative 1.91 MCGFEU/mL     Narrative:      According to the assay 's published package insert, a normal (<0.50 MCGFEU/mL) D-dimer result in conjunction with a non-high clinical probability assessment, excludes deep vein thrombosis (DVT) and pulmonary embolism (PE) with high sensitivity.    D-dimer values increase with age and this can make VTE exclusion of an older population difficult. To address this, the [employer], based on best available evidence and recent guidelines, recommends that clinicians use age-adjusted D-dimer thresholds in patients greater than 50 years of age with: a) a low probability of PE who do not meet all Pulmonary Embolism Rule Out Criteria, or b) in those with intermediate probability of PE.   The formula for an age-adjusted D-dimer cut-off is \"age/100\".  For example, a 60 year old patient would have an age-adjusted cut-off of 0.60 MCGFEU/mL and an 80 year old 0.80 MCGFEU/mL.    CBC & Differential [[id number]]  (Abnormal) Collected: 05/15/23 1928    Specimen: Blood Updated: 05/15/23 1940    Narrative:      The following orders were created for panel order CBC & Differential.  Procedure                               Abnormality         Status                     ---------                               -----------         ------                     CBC Auto Differential[[id number]]        Abnormal            Final result                 Please view results for these tests on the individual orders.    CBC Auto Differential [[id number]]  (Abnormal) Collected: 05/15/23 1928    Specimen: Blood Updated: 05/15/23 1940     WBC 14.77 10*3/mm3      RBC 5.28 10*6/mm3      Hemoglobin 15.5 g/dL      Hematocrit 46.5 %      MCV 88.1 fL      MCH 29.4 pg      MCHC 33.3 g/dL      RDW 12.9 %      RDW-SD " 41.1 fl      MPV 10.4 fL      Platelets 246 10*3/mm3      Neutrophil % 70.1 %      Lymphocyte % 20.8 %      Monocyte % 7.7 %      Eosinophil % 0.7 %      Basophil % 0.4 %      Immature Grans % 0.3 %      Neutrophils, Absolute 10.35 10*3/mm3      Lymphocytes, Absolute 3.07 10*3/mm3      Monocytes, Absolute 1.14 10*3/mm3      Eosinophils, Absolute 0.11 10*3/mm3      Basophils, Absolute 0.06 10*3/mm3      Immature Grans, Absolute 0.04 10*3/mm3      nRBC 0.0 /100 WBC            Results for orders placed during the hospital encounter of 05/15/23    Adult Transthoracic Echo Complete With Contrast if Necessary Per Protocol    Interpretation Summary    Left ventricular systolic function is mildly decreased. Left ventricular ejection fraction appears to be 41 - 45%.    The left ventricular cavity is mildly dilated.    The left atrial cavity is mildly dilated.    There is mild calcification of the mitral valve posterior leaflet(s). Trace mitral valve regurgitation is present.    The following segments are hypokinetic: apical septal, apical lateral and apex.       Condition on Discharge:  Stable     Physical Exam at Discharge    Vital Signs  Temp:  [98.4 °F (36.9 °C)-99.5 °F (37.5 °C)] 98.4 °F (36.9 °C)  Heart Rate:  [66-75] 70  Resp:  [16] 16  BP: (120-148)/(68-83) 120/68    Physical Exam:  Constitutional:       Appearance: Healthy appearance. Well-developed and not in distress.   Eyes:      Pupils: Pupils are equal, round, and reactive to light.   HENT:      Head: Normocephalic and atraumatic.   Neck:      Vascular: No carotid bruit or JVD.   Pulmonary:      Effort: Pulmonary effort is normal.      Breath sounds: Normal breath sounds.   Cardiovascular:      Normal rate. Regular rhythm.      Murmurs: There is no murmur.      Comments: Right groin soft, minimal swelling/bruising.   Pulses:     Intact distal pulses.   Edema:     Peripheral edema absent.   Abdominal:      General: Bowel sounds are normal.      Palpations:  Abdomen is soft.   Musculoskeletal: Normal range of motion.      Cervical back: Normal range of motion and neck supple. Skin:     General: Skin is warm and dry.   Neurological:      Mental Status: Alert and oriented to person, place, and time.      Deep Tendon Reflexes: Reflexes are normal and symmetric.   Psychiatric:         Behavior: Behavior normal.         Thought Content: Thought content normal.         Judgment: Judgment normal.        Discharge Disposition  Home or Self Care    Discharge Medications     Discharge Medications        New Medications        Instructions Start Date   aspirin 81 MG EC tablet   81 mg, Oral, Daily   Start Date: May 18, 2023     atorvastatin 40 MG tablet  Commonly known as: LIPITOR   40 mg, Oral, Nightly      carvedilol 3.125 MG tablet  Commonly known as: COREG   3.125 mg, Oral, 2 Times Daily With Meals      lisinopril 5 MG tablet  Commonly known as: PRINIVIL,ZESTRIL   5 mg, Oral, Daily   Start Date: May 18, 2023     nitroglycerin 0.4 MG SL tablet  Commonly known as: NITROSTAT   0.4 mg, Sublingual, Every 5 Minutes PRN, Take no more than 3 doses in 15 minutes.      ticagrelor 90 MG tablet tablet  Commonly known as: BRILINTA   90 mg, Oral, 2 Times Daily               Discharge Diet: Low Salt, Heart Healthy     Activity at Discharge: .Do not lift greater than 5 pounds. Do not drive for 48 hours. Do not bend and twist at waist. Shower only for one week, do not submerge in water. Discussed signs and symptoms of infection including drainage, redness, and pain. Discussed routine groin care.      Follow-up Appointments  No future appointments.  Additional Instructions for the Follow-ups that You Need to Schedule       Ambulatory Referral to Cardiac Rehab   As directed            Test Results Pending at Discharge:      Plan:  Patient will be discharged home today after echo and carotid ultrasound. Lengthy discussion had about medications and indications. Discussed importance of compliance  with Brilinta and Aspirin. Blood Pressure is better controlled- he does note his systolic BP has been running greater than 140 at home. Started on Coreg, Lisinopril and Lipitor. PRN Nitro. Cardiac Rehab- has been discussed with patient. Discussed cath site care. Follow up with PCP in 1-2 weeks. Follow up with our clinic in 1 month. Follow up with Dr. Beasley in 6 months.       Electronically signed by OPAL Lambert, 05/17/23, 10:59 AM CDT.     Time spent on discharge:  35 minutes

## 2023-05-17 NOTE — CONSULTS
Vascular Surgery Consultation    NAME:  Lm Nguyễn   : 1955  MRN: [id number]    Admitted on: 5/15/2023  6:19 PM    Requesting Physician: Dwayne Mims MD       History of Present Illness     I was consulted to see the patient for peripheral vascular occlusive disease with left subclavian artery stenosis.    Lm is a 66 yo  man admitted with chest pain. He did have some left arm radiation associated with this. Elevated troponin with NSTEMI. Went to cath lab earlier today and had LAD stent placed. He is feeling well now without any arm pain.    No amaurosis, TIA or CVA symptoms.     No significant claudication symptoms in arms or legs. No embolism symptoms in arms or legs.    Current Medical History     Lm Nguyễn is a 67 y.o. male with the following history reviewed and recorded in Epic:    Problem List Items Addressed This Visit          Cardiac and Vasculature    * (Principal) NSTEMI (non-ST elevated myocardial infarction)    Relevant Medications    carvedilol (COREG) tablet 3.125 mg    nitroglycerin (NITROSTAT) SL tablet 0.4 mg    nitroglycerin (NITROSTAT) SL tablet 0.4 mg    ticagrelor (BRILINTA) tablet 90 mg    Other Relevant Orders    Case Request Cath Lab: Left Heart Cath (Completed)    Cardiac Catheterization/Vascular Study (Completed)    Acute chest pain - Primary     Other Visit Diagnoses       Elevated troponin              Allergies: Patient has no known allergies.  Current Facility-Administered Medications   Medication Dose Route Frequency Provider Last Rate Last Admin    acetaminophen (TYLENOL) tablet 650 mg  650 mg Oral Q4H PRN Taco Castañeda DO        aspirin chewable tablet 324 mg  324 mg Oral Once Taco Castañeda DO        aspirin EC tablet 81 mg  81 mg Oral Daily Taco Castañeda DO   81 mg at 23 08    atorvastatin (LIPITOR) tablet 40 mg  40 mg Oral Nightly Taco Castañeda DO   40 mg at 23    carvedilol  "(COREG) tablet 3.125 mg  3.125 mg Oral BID With Meals Taco Castañeda, DO   3.125 mg at 05/16/23 1708    lisinopril (PRINIVIL,ZESTRIL) tablet 5 mg  5 mg Oral Daily Taco Castañeda, DO   5 mg at 05/16/23 0855    nitroglycerin (NITROSTAT) SL tablet 0.4 mg  0.4 mg Sublingual Q5 Min PRN Taco Castañeda, DO        nitroglycerin (NITROSTAT) SL tablet 0.4 mg  0.4 mg Sublingual Q5 Min PRN Taco Castañeda, DO        sodium chloride 0.9 % flush 10 mL  10 mL Intravenous PRN Taco Castañeda, DO        sodium chloride 0.9 % flush 10 mL  10 mL Intravenous Q12H Taco Castañeda, DO   10 mL at 05/16/23 0855    sodium chloride 0.9 % flush 10 mL  10 mL Intravenous PRN Taco Castañeda, DO        sodium chloride 0.9 % infusion 40 mL  40 mL Intravenous PRN Taco Castañeda, DO        sodium chloride 0.9 % infusion  100 mL/hr Intravenous Continuous Taco Castañeda,  mL/hr at 05/16/23 2227 100 mL/hr at 05/16/23 2227    ticagrelor (BRILINTA) tablet 90 mg  90 mg Oral BID Taco Castañeda, DO   90 mg at 05/16/23 2231     History reviewed. No pertinent past medical history.  History reviewed. No pertinent surgical history.  Family History   Problem Relation Age of Onset    Hyperlipidemia Mother     Heart disease Maternal Grandfather      Social History     Tobacco Use    Smoking status: Never    Smokeless tobacco: Never   Substance Use Topics    Alcohol use: Never       Review of Systems     See HPI  See H and P  All other review of systems are negative.     Physical Exam     /74 (BP Location: Left arm, Patient Position: Lying)   Pulse 75   Temp 99.5 °F (37.5 °C) (Oral)   Resp 16   Ht 180.3 cm (71\")   Wt 104 kg (229 lb 9.6 oz)   SpO2 96%   BMI 32.02 kg/m²     Constitutional --well developed, well nourished. No diaphoresis or acute distress.    Neck- ROM appears normal, no tracheal deviation, Carotid pulses are 2+ to palpation " bilaterally right neck bruit.   Cardiovascular -- Regular rate and rhythm.   Extremities - Radial and brachial pulses are 2+ to palpation bilaterally. Right femoral pulse: present 1+; Right DP: present 1+; Right PT: present 1+; Left femoral pulse: present 1+; Left DP: barely palpable; Left PT: barely palpable.  No cyanosis, clubbing, or significant edema.    Pulmonary -- effort appears normal. No respiratory distress.   Musculoskeletal ROM appears normal.   Neurologic -- alert and oriented X 3. Physiologic.   Skin -- warm, dry, and intact.  No rash, erythema, or pallor.   Psychiatric -- Appropriate mood, affect, and behavior appear normal. Judgment and thought processes appear normal.    Vascular Lab/Radiology    Exam: CT angiogram of the of the chest with intravenous contrast.     CLINICAL HISTORY:  Chest pain with elevated d-dimer.     DOSE:  346 mGycm. All CT scans are performed using dose optimization  techniques as appropriate to the performed exam and including at least  one of the following: Automated exposure control, adjustment of the mA  and/or kV according to size, and the use of the iterative reconstruction  technique.     TECHNIQUE: Contiguous axial images were obtained through the thorax  following intravenous contrast administration with reformatted images  obtained in the sagittal and coronal projections from the original data  set. Three-dimensional reconstructions are also obtained.     COMPARISON:  None available     FINDINGS:     Pulmonary arteries:  There is normal enhancement of the pulmonary  arteries with no evidence of pulmonary thromboembolic disease.     Aorta:  The thoracic aorta and great vessels are normal in caliber.  There is mural thrombus within the proximal left subclavian artery with  mild associated stenosis. No discrete intimal flap is demonstrated to  suggest dissection. No evidence of aneurysm.     LUNGS:  The lungs are clear. No evidence of lobar consolidation.  Mild  bronchial wall thickening is present perhaps related to chronic  bronchitis or reactive airway disease.     Pleural spaces: Unremarkable. No evidence of pleural effusion or  pneumothorax.     HEART: There is mild cardiomegaly. Moderate coronary calcifications are  present, particularly in the LAD distribution. There is no pericardial  effusion. Elevated right heart pressure is suspected with reflux of  contrast into the intrahepatic IVC.     Bones: No acute osseous abnormalities are demonstrated. There is  spondylosis within the mid and lower thoracic spine.     CHEST WALL: No chest wall abnormalities are demonstrated.     Lymph nodes: No enlarged mediastinal or axillary lymphadenopathy is  present.     Upper abdomen: Limited images of the upper abdomen are unremarkable.     IMPRESSION:  1. No evidence of pulmonary thromboembolic disease.  2. No evidence of aneurysm or dissection. There is some mild plaquing  and mural thrombus with mild stenosis in the proximal segment left  subclavian artery. No discrete intimal flap is demonstrated.  3. No evidence of consolidative pneumonia or effusion. There is mild  basilar atelectasis.  4. Cardiomegaly with moderate coronary calcifications. Elevated right  heart pressure suspected with reflux of contrast into the intrahepatic  IVC.  5. Mild bronchial wall thickening bilaterally suggesting an element of  bronchitis or reactive airway disease.  This report was finalized on 05/15/2023 21:09 by Dr. Jimmy Pretty MD.    Assessment     1. Acute chest pain    2. Elevated troponin    3. NSTEMI (non-ST elevated myocardial infarction)      Left subclavian artery stenosis with soft plaque vs. Mural thrombus, less than 50% stenosis and asymptomatic. No evidence of dissection or aneurysm.    PVD with diminished pulse left foot, but no significant claudication symptoms.    Plan     Bilateral carotid duplex   OK to go home if carotid duplex ok from vascular surgery standpoint  No  intervention indicated at this time for left subclavian artery stenosis/mural thrombus vs soft plaque.  He should f/u with me in office in 6 months    Electronically signed by Kamran Beasley MD on 5/17/2023 at 00:57 CDT

## 2023-05-17 NOTE — PLAN OF CARE
Goal Outcome Evaluation:  Plan of Care Reviewed With: patient        Progress: improving         Outcome Evaluation: VSS, no c/o pain, right groin c/d/i soft, pulses dopplered, Sinus 63-77 with pvc's and couplets.

## 2023-05-18 LAB
QT INTERVAL: 442 MS
QTC INTERVAL: 497 MS

## 2023-05-18 NOTE — OUTREACH NOTE
Prep Survey    Flowsheet Row Responses   [facility] facility patient discharged from? [place]   Is LACE score < 7 ? No   Eligibility Readm Mgmt   Discharge diagnosis NSTEMI   Does the patient have one of the following disease processes/diagnoses(primary or secondary)? Acute MI (STEMI,NSTEMI)   Does the patient have Home health ordered? No   Is there a DME ordered? No   Prep survey completed? Yes          Lina KIDD - Registered Nurse

## 2023-05-19 ENCOUNTER — READMISSION MANAGEMENT (OUTPATIENT)
Dept: CALL CENTER | Facility: HOSPITAL | Age: 68
End: 2023-05-19
Payer: MEDICARE

## 2023-05-19 NOTE — OUTREACH NOTE
AMI Week 1 Survey    Flowsheet Row Responses   Denominational facility patient discharged from? [place]   Does the patient have one of the following disease processes/diagnoses(primary or secondary)? Acute MI (STEMI,NSTEMI)   Week 1 attempt successful? No   Unsuccessful attempts Attempt 1          Linda CLARK - Registered Nurse

## 2023-05-23 ENCOUNTER — READMISSION MANAGEMENT (OUTPATIENT)
Dept: CALL CENTER | Facility: HOSPITAL | Age: 68
End: 2023-05-23
Payer: MEDICARE

## 2023-05-23 NOTE — OUTREACH NOTE
AMI Week 1 Survey    Flowsheet Row Responses   [facility] patient discharged from? [place]   Does the patient have one of the following disease processes/diagnoses(primary or secondary)? Acute MI (STEMI,NSTEMI)   Week 1 attempt successful? Yes   Call start time 1300   Call end time 1301   Discharge diagnosis NSTEMI   Meds reviewed with patient/caregiver? Yes   Is the patient having any side effects they believe may be caused by any medication additions or changes? No   Does the patient have all prescriptions related to this admission filled (includes statins,anticoagulants,HTN meds,anti-arrhythmia meds) Yes   Is the patient taking all medications as directed (includes completed medication regime)? Yes   Comments regarding appointments cards apt on 6/20/23   Does the patient have a primary care provider?  Yes   Comments regarding PCP PCP apt on 5/24/23   Has the patient kept scheduled appointments due by today? N/A   Has home health visited the patient within 72 hours of discharge? N/A   Psychosocial issues? No   Did the patient receive a copy of their discharge instructions? Yes   Nursing interventions Reviewed instructions with patient   What is the patient's perception of their health status since discharge? Improving   Nursing interventions Nurse provided patient education   Is the patient/caregiver able to teach back signs and symptoms of when to call for help immediately: Sudden chest discomfort, Sudden discomfort in arms, back, neck or jaw, Shortness of breath at any time, Sudden sweating or clammy skin, Nausea or vomiting, Dizziness or lightheadedness, Irregular or rapid heart rate   Nursing interventions Nurse provided patient education   Is the patient/caregiver able to teach back lifestyle changes to help prevent MIs Quit smoking, Heart healthy diet, Regular exercise as approved by provider, Reducing stress   Is the patient/caregiver able to teach back ways to prevent a second heart attack: Take  medications, Follow up with MD   If the patient is a current smoker, are they able to teach back resources for cessation? Not a smoker   Is the patient/caregiver able to teach back the hierarchy of who to call/visit for symptoms/problems? PCP, Specialist, Home health nurse, Urgent Care, ED, 911 Yes   Week 1 call completed? Yes   Revoked No further contact(revokes)-requires comment   Graduated/Revoked comments Pt doing well - no issues or concerns during call - pt states he has a PCP apt on 5/24/23          Sarah H - Registered Nurse

## 2023-05-25 ENCOUNTER — TELEPHONE (OUTPATIENT)
Dept: CARDIOLOGY | Facility: CLINIC | Age: 68
End: 2023-05-25
Payer: MEDICARE

## 2023-05-25 NOTE — TELEPHONE ENCOUNTER
Caller: Shira Nguyễn    Relationship: Self    Best call back number: [phone]    What is the best time to reach you: ANYTIME    Who are you requesting to speak with (clinical staff, provider,  specific staff member): CLINICAL STAFF    Do you know the name of the person who called: SHIRA NGUYỄN    What was the call regarding: PATIENT IS CONCERNED ABOUT HIS OUT OF POCKET COST OF BRILINTA. HE WOULD LIKE A CALL BACK TO DISCUSS PATIENT ASSISTANCE FOR THIS MEDICATION.    Do you require a callback: YES

## 2023-05-25 NOTE — TELEPHONE ENCOUNTER
Call returned to patient. Patient has used Brilinta discount card for refills.Informed he may use discount card each time for refills. V/U.

## 2023-06-20 PROBLEM — I25.10 CORONARY ARTERY DISEASE INVOLVING NATIVE CORONARY ARTERY OF NATIVE HEART WITHOUT ANGINA PECTORIS: Status: ACTIVE | Noted: 2023-06-20

## 2023-11-20 ENCOUNTER — OFFICE VISIT (OUTPATIENT)
Dept: CARDIOLOGY | Facility: CLINIC | Age: 68
End: 2023-11-20
Payer: MEDICARE

## 2023-11-20 VITALS
OXYGEN SATURATION: 98 % | DIASTOLIC BLOOD PRESSURE: 64 MMHG | WEIGHT: 234 LBS | SYSTOLIC BLOOD PRESSURE: 134 MMHG | HEART RATE: 63 BPM | HEIGHT: 71 IN | BODY MASS INDEX: 32.76 KG/M2

## 2023-11-20 DIAGNOSIS — I10 ESSENTIAL HYPERTENSION: ICD-10-CM

## 2023-11-20 DIAGNOSIS — I25.10 CORONARY ARTERY DISEASE INVOLVING NATIVE CORONARY ARTERY OF NATIVE HEART WITHOUT ANGINA PECTORIS: ICD-10-CM

## 2023-11-20 DIAGNOSIS — E78.00 PURE HYPERCHOLESTEROLEMIA: ICD-10-CM

## 2023-11-20 DIAGNOSIS — I25.5 ISCHEMIC CARDIOMYOPATHY: ICD-10-CM

## 2023-11-20 DIAGNOSIS — I73.9 SUBCLAVIAN ARTERY DISEASE: ICD-10-CM

## 2023-11-20 DIAGNOSIS — I50.22 CHRONIC SYSTOLIC CONGESTIVE HEART FAILURE: Primary | ICD-10-CM

## 2023-11-20 RX ORDER — METOPROLOL SUCCINATE 25 MG/1
25 TABLET, EXTENDED RELEASE ORAL DAILY
Qty: 90 TABLET | Refills: 3 | Status: SHIPPED | OUTPATIENT
Start: 2023-11-20

## 2023-11-20 NOTE — PROGRESS NOTES
Subjective:     Encounter Date:11/20/2023      Patient ID: Lm Nguyễn is a 68 y.o. male.    Chief Complaint:  History of Present Illness    Lm Nguyễn a 68-year-old male who presents for follow-up. He is accompanied by an adult female.    The patient reports he has been doing pretty good and notes he feels tired all of the time since 06/2023. He notes the medicine makes him sleepy. He denies any chest pain since 05/2023.     He states he  went to the dentist and had a root canal done, but it did not work. He adds he panicked and called the ambulance and he was taken to the hospital. He confirms he started having severe pain and his blood pressure became elevated.     He notes he has occasional shortness of breath and denies any swelling in his legs or ankles. He adds he has itching on his arms. He confirms he had a clot in the subclavian and has an appointment with Dr. Beasley today 11/20/2023.    He confirms he was started on Coreg last at his last visit. The patient confirms He is still taking aspirin and Brilinta. He is currently prescribed lisinopril and Lipitor.        Review of Systems   Constitutional: Negative for diaphoresis, fever and malaise/fatigue.   HENT:  Negative for congestion.    Eyes:  Negative for vision loss in left eye and vision loss in right eye.   Cardiovascular:  Negative for chest pain, claudication, dyspnea on exertion, irregular heartbeat, leg swelling, orthopnea, palpitations and syncope.   Respiratory:  Positive for shortness of breath. Negative for cough and wheezing.    Hematologic/Lymphatic: Negative for adenopathy.   Skin:  Positive for itching. Negative for rash.   Musculoskeletal:  Negative for joint pain and joint swelling.   Gastrointestinal:  Negative for abdominal pain, diarrhea, nausea and vomiting.   Neurological:  Negative for excessive daytime sleepiness, dizziness, focal weakness, light-headedness, numbness and weakness.   Psychiatric/Behavioral:  Negative  for depression. The patient does not have insomnia.            Current Outpatient Medications:     aspirin 81 MG EC tablet, Take 1 tablet by mouth Daily., Disp: 30 tablet, Rfl: 11    atorvastatin (LIPITOR) 40 MG tablet, Take 1 tablet by mouth Every Night., Disp: 30 tablet, Rfl: 11    lisinopril (PRINIVIL,ZESTRIL) 5 MG tablet, Take 1 tablet by mouth Daily., Disp: 30 tablet, Rfl: 11    nitroglycerin (NITROSTAT) 0.4 MG SL tablet, Place 1 tablet under the tongue Every 5 (Five) Minutes As Needed for Chest Pain. Take no more than 3 doses in 15 minutes., Disp: 30 tablet, Rfl: 1    ticagrelor (BRILINTA) 90 MG tablet tablet, Take 1 tablet by mouth 2 (Two) Times a Day., Disp: 60 tablet, Rfl: 11    metoprolol succinate XL (TOPROL-XL) 25 MG 24 hr tablet, Take 1 tablet by mouth Daily., Disp: 90 tablet, Rfl: 3       Objective:      Vitals:    11/20/23 0903   BP: 134/64   Pulse: 63   SpO2: 98%     Vitals and nursing note reviewed.   Constitutional:       Appearance: Normal and healthy appearance. Well-developed and not in distress.   Eyes:      Extraocular Movements: Extraocular movements intact.      Pupils: Pupils are equal, round, and reactive to light.   HENT:      Head: Normocephalic and atraumatic.    Mouth/Throat:      Pharynx: Oropharynx is clear.   Neck:      Vascular: JVD normal.      Trachea: Trachea normal.   Pulmonary:      Effort: Pulmonary effort is normal.      Breath sounds: Normal breath sounds. No wheezing. No rhonchi. No rales.   Cardiovascular:      PMI at left midclavicular line. Normal rate. Regular rhythm. Normal S1. Normal S2.       Murmurs: There is no murmur. There is a grade 2/6 murmur.      No gallop.  No click. No rub.   Pulses:     Intact distal pulses.      Dorsalis pedis: 2+ bilaterally.     Posterior tibial: 2+ bilaterally.  Edema:     Peripheral edema absent.   Abdominal:      General: Bowel sounds are normal.      Palpations: Abdomen is soft.      Tenderness: There is no abdominal tenderness.    Musculoskeletal: Normal range of motion.      Cervical back: Normal range of motion and neck supple. Skin:     General: Skin is warm and dry.      Capillary Refill: Capillary refill takes less than 2 seconds.   Feet:      Right foot:      Skin integrity: Skin integrity normal.      Left foot:      Skin integrity: Skin integrity normal.   Neurological:      Mental Status: Alert and oriented to person, place and time.      Sensory: Sensation is intact.      Motor: Motor function is intact.      Coordination: Coordination is intact.   Psychiatric:         Speech: Speech normal.         Behavior: Behavior is cooperative.         Lab Review:         ECG 12 Lead    Date/Time: 11/30/2023 5:49 PM  Performed by: Taco Castañeda DO    Authorized by: Taco Castañeda DO  Comparison: compared with previous ECG   Similar to previous ECG  Rhythm: sinus rhythm    Clinical impression: normal ECG            Assessment/Plan:     Problem List Items Addressed This Visit       Subclavian artery disease    Pure hypercholesterolemia    Essential hypertension    Relevant Medications    metoprolol succinate XL (TOPROL-XL) 25 MG 24 hr tablet    Ischemic cardiomyopathy    Relevant Medications    metoprolol succinate XL (TOPROL-XL) 25 MG 24 hr tablet    Coronary artery disease involving native coronary artery of native heart without angina pectoris    Relevant Medications    metoprolol succinate XL (TOPROL-XL) 25 MG 24 hr tablet     Other Visit Diagnoses       Chronic systolic congestive heart failure    -  Primary    Relevant Medications    metoprolol succinate XL (TOPROL-XL) 25 MG 24 hr tablet    Other Relevant Orders    Adult Transthoracic Echo Complete W/ Cont if Necessary Per Protocol          Cardiac health maintenance  - The patient presents today for a 5-month follow up for coronary artery disease. The patient underwent a heart catheterization and had a stent placed in his LAD in 05/2023. We reviewed and  discussed the patient's heart catheterization and cardiac testing results in full detail. An order was placed for a repeat ultrasound to make sure the heart has gotten stronger.      The patient was informed to continue Brilinta until 05/2024 and he will continue Lipitor and lisinopril 5 mg. The patient was advised to discontinue carvedilol. He will start metoprolol once a day and a prescription order was placed and sent to the patient's pharmacy.     The patient will follow up in 6 months and was advised to contact the office if any cardiac issues arise.    Recommendations/plans:    Transcribed from ambient dictation for Taco Castañeda DO by Malcolm Ferguson.  11/20/23   10:27 CST    Patient or patient representative verbalized consent to the visit recording.  I have personally performed the services described in this document as transcribed by the above individual, and it is both accurate and complete.  Taco Castañeda DO  11/30/2023  17:50 CST

## 2023-12-05 ENCOUNTER — HOSPITAL ENCOUNTER (OUTPATIENT)
Dept: CARDIOLOGY | Facility: HOSPITAL | Age: 68
Discharge: HOME | End: 2023-12-05
Payer: MEDICARE

## 2023-12-05 VITALS
WEIGHT: 234 LBS | HEIGHT: 71 IN | SYSTOLIC BLOOD PRESSURE: 134 MMHG | DIASTOLIC BLOOD PRESSURE: 64 MMHG | BODY MASS INDEX: 32.76 KG/M2

## 2023-12-05 DIAGNOSIS — I50.22 CHRONIC SYSTOLIC CONGESTIVE HEART FAILURE: ICD-10-CM

## 2023-12-05 PROCEDURE — 93306 TTE W/DOPPLER COMPLETE: CPT

## 2023-12-05 PROCEDURE — 25510000001 PERFLUTREN 6.52 MG/ML SUSPENSION: Performed by: EMERGENCY MEDICINE

## 2023-12-05 RX ADMIN — PERFLUTREN 13.04 MG: 6.52 INJECTION, SUSPENSION INTRAVENOUS at 10:47

## 2023-12-11 LAB
BH CV ECHO MEAS - AO MAX PG: 6 MMHG
BH CV ECHO MEAS - AO MEAN PG: 3 MMHG
BH CV ECHO MEAS - AO ROOT DIAM: 3.5 CM
BH CV ECHO MEAS - AO V2 MAX: 122 CM/SEC
BH CV ECHO MEAS - AO V2 VTI: 27.5 CM
BH CV ECHO MEAS - AVA(I,D): 2.7 CM2
BH CV ECHO MEAS - EDV(CUBED): 185.2 ML
BH CV ECHO MEAS - EDV(MOD-SP2): 163 ML
BH CV ECHO MEAS - EDV(MOD-SP4): 187 ML
BH CV ECHO MEAS - EF(MOD-SP2): 42.5 %
BH CV ECHO MEAS - EF(MOD-SP4): 55.3 %
BH CV ECHO MEAS - ESV(CUBED): 64 ML
BH CV ECHO MEAS - ESV(MOD-SP2): 93.8 ML
BH CV ECHO MEAS - ESV(MOD-SP4): 83.5 ML
BH CV ECHO MEAS - FS: 29.8 %
BH CV ECHO MEAS - IVS/LVPW: 0.91 CM
BH CV ECHO MEAS - IVSD: 1 CM
BH CV ECHO MEAS - LA DIMENSION: 4.7 CM
BH CV ECHO MEAS - LAT PEAK E' VEL: 9.4 CM/SEC
BH CV ECHO MEAS - LV DIASTOLIC VOL/BSA (35-75): 82.9 CM2
BH CV ECHO MEAS - LV MASS(C)D: 241.3 GRAMS
BH CV ECHO MEAS - LV MAX PG: 2.23 MMHG
BH CV ECHO MEAS - LV MEAN PG: 1 MMHG
BH CV ECHO MEAS - LV SYSTOLIC VOL/BSA (12-30): 37 CM2
BH CV ECHO MEAS - LV V1 MAX: 74.7 CM/SEC
BH CV ECHO MEAS - LV V1 VTI: 18 CM
BH CV ECHO MEAS - LVIDD: 5.7 CM
BH CV ECHO MEAS - LVIDS: 4 CM
BH CV ECHO MEAS - LVOT AREA: 4.2 CM2
BH CV ECHO MEAS - LVOT DIAM: 2.3 CM
BH CV ECHO MEAS - LVPWD: 1.1 CM
BH CV ECHO MEAS - MED PEAK E' VEL: 4.1 CM/SEC
BH CV ECHO MEAS - MV A MAX VEL: 59.2 CM/SEC
BH CV ECHO MEAS - MV DEC SLOPE: 385 CM/SEC2
BH CV ECHO MEAS - MV DEC TIME: 0.21 SEC
BH CV ECHO MEAS - MV E MAX VEL: 80 CM/SEC
BH CV ECHO MEAS - MV E/A: 1.35
BH CV ECHO MEAS - MV MAX PG: 2.41 MMHG
BH CV ECHO MEAS - MV MEAN PG: 1 MMHG
BH CV ECHO MEAS - MV V2 VTI: 30.8 CM
BH CV ECHO MEAS - MVA(VTI): 2.43 CM2
BH CV ECHO MEAS - PA V2 MAX: 83.6 CM/SEC
BH CV ECHO MEAS - PULM A REVS DUR: 0.12 SEC
BH CV ECHO MEAS - PULM A REVS VEL: 24.3 CM/SEC
BH CV ECHO MEAS - PULM DIAS VEL: 46.4 CM/SEC
BH CV ECHO MEAS - PULM S/D: 1.16
BH CV ECHO MEAS - PULM SYS VEL: 53.7 CM/SEC
BH CV ECHO MEAS - RAP SYSTOLE: 5 MMHG
BH CV ECHO MEAS - RV MAX PG: 1.47 MMHG
BH CV ECHO MEAS - RV V1 MAX: 60.6 CM/SEC
BH CV ECHO MEAS - RV V1 VTI: 13.6 CM
BH CV ECHO MEAS - RVDD: 4 CM
BH CV ECHO MEAS - RVSP: 36.6 MMHG
BH CV ECHO MEAS - SI(MOD-SP2): 30.7 ML/M2
BH CV ECHO MEAS - SI(MOD-SP4): 45.9 ML/M2
BH CV ECHO MEAS - SV(LVOT): 74.8 ML
BH CV ECHO MEAS - SV(MOD-SP2): 69.2 ML
BH CV ECHO MEAS - SV(MOD-SP4): 103.5 ML
BH CV ECHO MEAS - TR MAX PG: 31.6 MMHG
BH CV ECHO MEAS - TR MAX VEL: 281 CM/SEC
BH CV ECHO MEASUREMENTS AVERAGE E/E' RATIO: 11.85
BH CV XLRA - TDI S': 9.6 CM/SEC
LEFT ATRIUM VOLUME INDEX: 44 ML/M2
LEFT ATRIUM VOLUME: 105 ML

## 2024-02-26 ENCOUNTER — OFFICE VISIT (OUTPATIENT)
Dept: CARDIOLOGY CLINIC | Age: 69
End: 2024-02-26
Payer: COMMERCIAL

## 2024-02-26 VITALS
SYSTOLIC BLOOD PRESSURE: 122 MMHG | HEART RATE: 52 BPM | BODY MASS INDEX: 32.9 KG/M2 | DIASTOLIC BLOOD PRESSURE: 66 MMHG | WEIGHT: 235 LBS | OXYGEN SATURATION: 98 % | HEIGHT: 71 IN

## 2024-02-26 DIAGNOSIS — I50.22 CHRONIC SYSTOLIC HEART FAILURE (HCC): ICD-10-CM

## 2024-02-26 DIAGNOSIS — E78.5 DYSLIPIDEMIA: ICD-10-CM

## 2024-02-26 DIAGNOSIS — I25.10 CORONARY ARTERY DISEASE INVOLVING NATIVE CORONARY ARTERY OF NATIVE HEART WITHOUT ANGINA PECTORIS: Primary | ICD-10-CM

## 2024-02-26 DIAGNOSIS — I10 ESSENTIAL HYPERTENSION: ICD-10-CM

## 2024-02-26 PROCEDURE — 3074F SYST BP LT 130 MM HG: CPT | Performed by: NURSE PRACTITIONER

## 2024-02-26 PROCEDURE — 99204 OFFICE O/P NEW MOD 45 MIN: CPT | Performed by: NURSE PRACTITIONER

## 2024-02-26 PROCEDURE — 93000 ELECTROCARDIOGRAM COMPLETE: CPT | Performed by: NURSE PRACTITIONER

## 2024-02-26 PROCEDURE — 3078F DIAST BP <80 MM HG: CPT | Performed by: NURSE PRACTITIONER

## 2024-02-26 PROCEDURE — 1123F ACP DISCUSS/DSCN MKR DOCD: CPT | Performed by: NURSE PRACTITIONER

## 2024-02-26 RX ORDER — CLOPIDOGREL BISULFATE 75 MG/1
75 TABLET ORAL DAILY
COMMUNITY
Start: 2024-02-08

## 2024-02-26 RX ORDER — LISINOPRIL 5 MG/1
5 TABLET ORAL DAILY
COMMUNITY
Start: 2024-01-27

## 2024-02-26 RX ORDER — ASPIRIN 81 MG/1
81 TABLET ORAL DAILY
COMMUNITY
Start: 2024-01-27

## 2024-02-26 RX ORDER — ATORVASTATIN CALCIUM 40 MG/1
40 TABLET, FILM COATED ORAL NIGHTLY
COMMUNITY
Start: 2024-01-27

## 2024-02-26 RX ORDER — CARVEDILOL 3.12 MG/1
3.12 TABLET ORAL 2 TIMES DAILY WITH MEALS
COMMUNITY
Start: 2024-01-27

## 2024-02-26 ASSESSMENT — ENCOUNTER SYMPTOMS
SORE THROAT: 0
CHEST TIGHTNESS: 0
WHEEZING: 0
COUGH: 0
SHORTNESS OF BREATH: 0

## 2024-02-26 NOTE — PROGRESS NOTES
Initial encounter   No chest pain.  EKG in the office today showing sinus bradycardia with first-degree AV block.  Patient is on Coreg, Plavix, aspirin and Lipitor.  Continue present medical management         2. Chronic systolic congestive heart failure Initial encounter   No overt signs of failure on Coreg and lisinopril.  Would recommend on return to clinic ordering a 2D echo to evaluate current EF.         3. Hypertension Initial encounter   Patient is on Coreg 3.125 mg twice daily.  Lisinopril 5 mg daily.  Continue present medical management       PLAN:    Orders Placed This Encounter   Procedures    EKG 12 lead     Order Specific Question:   Reason for Exam?     Answer:   Other     No orders of the defined types were placed in this encounter.      Return in about 6 months (around 8/26/2024) for Dr Young (NP) .    Discussed with patient and spouse.    I greatly appreciate the opportunity to meet Jimy Hernandezburn and your confidence in allowing me to participate in his cardiovascular care.      OSMAN Manning - NP 2/26/2024 1:48 PM CST                    This dictation was generated by voice recognition computer software. Although all attempts are made to edit dictation for accuracy, there may be errors in the transcription that are not intended.

## 2024-04-30 RX ORDER — ASPIRIN 81 MG/1
81 TABLET ORAL DAILY
Qty: 90 TABLET | Refills: 3 | Status: SHIPPED | OUTPATIENT
Start: 2024-04-30

## 2024-04-30 RX ORDER — ATORVASTATIN CALCIUM 40 MG/1
40 TABLET, FILM COATED ORAL
Qty: 90 TABLET | Refills: 3 | Status: SHIPPED | OUTPATIENT
Start: 2024-04-30

## 2024-04-30 RX ORDER — LISINOPRIL 5 MG/1
5 TABLET ORAL DAILY
Qty: 90 TABLET | Refills: 3 | Status: SHIPPED | OUTPATIENT
Start: 2024-04-30

## 2024-05-01 RX ORDER — TICAGRELOR 90 MG/1
90 TABLET ORAL 2 TIMES DAILY
Qty: 180 TABLET | Refills: 3 | Status: SHIPPED | OUTPATIENT
Start: 2024-05-01

## 2024-05-03 ENCOUNTER — PROCEDURE VISIT (OUTPATIENT)
Dept: ENT CLINIC | Age: 69
End: 2024-05-03

## 2024-05-03 VITALS
BODY MASS INDEX: 32.9 KG/M2 | DIASTOLIC BLOOD PRESSURE: 66 MMHG | HEIGHT: 71 IN | SYSTOLIC BLOOD PRESSURE: 130 MMHG | WEIGHT: 235 LBS

## 2024-05-03 DIAGNOSIS — H61.91 LESION OF RIGHT EXTERNAL EAR: Primary | ICD-10-CM

## 2024-05-03 NOTE — PROGRESS NOTES
[facility]        PREOP NOTE   Mr. Michel is a very pleasant 68-year-old  male that previously seen due to a lesion to the right external auricular region.  He reports this been present for several years but has significant got larger in size and has become painful.  The risks, benefits, and options were discussed with the patient.  He was agreeable to accept all risks and proceed with excision of the lesion under local anesthesia.    PROCEDURE NOTE:  After informed consent was obtained, the lesion was anesthetized with 1% lidocaine with epinephrine.  An elliptical incision was made around the base of the cystic lesion.  The lesion was removed in full-thickness fashion with no contamination.  This measured approximate 2 cm in size.  The specimen was placed in formalin for pathological analysis.  The open wound was closed with 5 interrupted 5-0 Prolene sutures without any complications.  Overall the patient had minimal bleeding with no complications.  Wound care instructions were discussed with the patient.  Recommended taking over-the-counter Tylenol or ibuprofen for postprocedural pain.  He is to follow-up in 10 days for suture removal.  We will call him the pathology results once this has been completed.      Electronically signed by KHUSHBU ALMANZAR PA-C on 5/3/24 at 12:36 PM CDT

## 2024-05-08 ENCOUNTER — TELEPHONE (OUTPATIENT)
Dept: ENT CLINIC | Age: 69
End: 2024-05-08

## 2024-05-17 ENCOUNTER — OFFICE VISIT (OUTPATIENT)
Dept: ENT CLINIC | Age: 69
End: 2024-05-17
Payer: COMMERCIAL

## 2024-05-17 VITALS
DIASTOLIC BLOOD PRESSURE: 70 MMHG | BODY MASS INDEX: 33.04 KG/M2 | SYSTOLIC BLOOD PRESSURE: 132 MMHG | HEIGHT: 71 IN | WEIGHT: 236 LBS

## 2024-05-17 DIAGNOSIS — H61.91 LESION OF RIGHT EXTERNAL EAR: Primary | ICD-10-CM

## 2024-05-17 PROCEDURE — 1123F ACP DISCUSS/DSCN MKR DOCD: CPT | Performed by: PHYSICIAN ASSISTANT

## 2024-05-17 PROCEDURE — 99212 OFFICE O/P EST SF 10 MIN: CPT | Performed by: PHYSICIAN ASSISTANT

## 2024-05-17 NOTE — PROGRESS NOTES
Mr. Michel is a pleasant 68-year-old  male that presents for a 2-week follow-up after excision of an ulcerative lesion to the auricular region of the right ear.  Pathological analysis of the specimen demonstrated a keloid with no evidence of a skin cancer.  Overall the patient reports that he is doing well with no issues.      Physical examination demonstrated the wound site to be completely healed with no gapping or any evidence of infection.  The clear Prolene sutures were removed without any complications.  After discontinuation of the sutures, the wound continued to appear stable with no changes.      Impression: Doing well status post 2 weeks from excision of right benign ear lesion    Plan: Patient is to follow-up with me as needed.  He was reminded to call if he has any questions or concerns.      Electronically signed by KHUSHBU ALMANZAR PA-C on 5/17/24 at 11:31 AM [facility]

## 2025-04-24 ENCOUNTER — OFFICE VISIT (OUTPATIENT)
Dept: CARDIOLOGY | Facility: CLINIC | Age: 70
End: 2025-04-24
Payer: MEDICARE

## 2025-04-24 VITALS
HEIGHT: 71 IN | OXYGEN SATURATION: 98 % | WEIGHT: 248 LBS | DIASTOLIC BLOOD PRESSURE: 62 MMHG | BODY MASS INDEX: 34.72 KG/M2 | HEART RATE: 62 BPM | SYSTOLIC BLOOD PRESSURE: 120 MMHG

## 2025-04-24 DIAGNOSIS — I50.22 CHRONIC SYSTOLIC CONGESTIVE HEART FAILURE: Primary | ICD-10-CM

## 2025-04-24 DIAGNOSIS — E78.00 PURE HYPERCHOLESTEROLEMIA: ICD-10-CM

## 2025-04-24 DIAGNOSIS — I25.5 ISCHEMIC CARDIOMYOPATHY: ICD-10-CM

## 2025-04-24 DIAGNOSIS — I10 ESSENTIAL HYPERTENSION: ICD-10-CM

## 2025-04-24 DIAGNOSIS — I25.702 CORONARY ARTERY DISEASE INVOLVING CORONARY BYPASS GRAFT OF NATIVE HEART WITH REFRACTORY ANGINA PECTORIS: ICD-10-CM

## 2025-04-24 RX ORDER — ATORVASTATIN CALCIUM 40 MG/1
40 TABLET, FILM COATED ORAL
Qty: 90 TABLET | Refills: 3 | Status: SHIPPED | OUTPATIENT
Start: 2025-04-24

## 2025-04-24 RX ORDER — NITROGLYCERIN 0.4 MG/1
0.4 TABLET SUBLINGUAL
Qty: 30 TABLET | Refills: 1 | Status: SHIPPED | OUTPATIENT
Start: 2025-04-24

## 2025-04-24 RX ORDER — CLOPIDOGREL BISULFATE 75 MG/1
75 TABLET ORAL DAILY
Qty: 90 TABLET | Refills: 3 | Status: SHIPPED | OUTPATIENT
Start: 2025-04-24

## 2025-04-24 RX ORDER — LISINOPRIL 5 MG/1
5 TABLET ORAL DAILY
Qty: 90 TABLET | Refills: 3 | Status: SHIPPED | OUTPATIENT
Start: 2025-04-24

## 2025-04-24 RX ORDER — CARVEDILOL 3.12 MG/1
3.12 TABLET ORAL 2 TIMES DAILY WITH MEALS
COMMUNITY
End: 2025-04-24 | Stop reason: SDUPTHER

## 2025-04-24 RX ORDER — CARVEDILOL 3.12 MG/1
3.12 TABLET ORAL 2 TIMES DAILY WITH MEALS
Qty: 180 TABLET | Refills: 3 | Status: SHIPPED | OUTPATIENT
Start: 2025-04-24

## 2025-04-24 RX ORDER — ASPIRIN 81 MG/1
81 TABLET ORAL DAILY
Qty: 90 TABLET | Refills: 3 | Status: SHIPPED | OUTPATIENT
Start: 2025-04-24

## 2025-04-24 NOTE — PROGRESS NOTES
"     Subjective:     Encounter Date:2025      Patient ID: Lm Nguyễn is a 69 y.o. male.    Chief Complaint:  History of Present Illness  History of Present Illness  The patient presents for evaluation of dizziness, shortness of breath, and fatigue.    Dizziness upon standing, accompanied by shortness of breath, is reported. A persistent feeling of fatigue is also noted. Approximately 3 to 4 months ago, an episode of syncope occurred, resulting in a fall from a lawnmower. The patient has been taken off aspirin by the pharmacy and is currently on a regimen of four pills daily. Blood pressure and heart rate are within normal limits. No leg or ankle swelling is reported, although there is itching in the legs. Wide feet are also mentioned.    PAST SURGICAL HISTORY:  In , an echocardiogram revealed a slightly weak heart with an ejection fraction of 41-45% and trace mitral regurgitation. A heart catheterization was performed for an NSTEMI, and a stent was placed in the left anterior descending artery (\" maker\").    FAMILY HISTORY  - Brother:  last fall from a heart attack    The following portions of the patient's history were reviewed and updated as appropriate: allergies, current medications, past family history, past medical history, past social history, past surgical history, and problem list.    Review of Systems   Constitutional: Positive for malaise/fatigue. Negative for diaphoresis and fever.   HENT:  Negative for congestion.    Eyes:  Negative for vision loss in left eye and vision loss in right eye.   Cardiovascular:  Positive for chest pain. Negative for claudication, dyspnea on exertion, irregular heartbeat, leg swelling, orthopnea, palpitations and syncope.   Respiratory:  Positive for shortness of breath. Negative for cough and wheezing.    Hematologic/Lymphatic: Negative for adenopathy.   Skin:  Negative for rash.   Musculoskeletal:  Negative for joint pain and joint swelling. "   Gastrointestinal:  Negative for abdominal pain, diarrhea, nausea and vomiting.   Neurological:  Positive for dizziness. Negative for excessive daytime sleepiness, focal weakness, light-headedness, numbness and weakness.   Psychiatric/Behavioral:  Negative for depression. The patient does not have insomnia.            Current Outpatient Medications:     atorvastatin (LIPITOR) 40 MG tablet, Take 1 tablet by mouth every night at bedtime., Disp: 90 tablet, Rfl: 3    carvedilol (COREG) 3.125 MG tablet, Take 1 tablet by mouth 2 (Two) Times a Day With Meals., Disp: 180 tablet, Rfl: 3    lisinopril (PRINIVIL,ZESTRIL) 5 MG tablet, Take 1 tablet by mouth Daily., Disp: 90 tablet, Rfl: 3    nitroglycerin (NITROSTAT) 0.4 MG SL tablet, Place 1 tablet under the tongue Every 5 (Five) Minutes As Needed for Chest Pain. Take no more than 3 doses in 15 minutes., Disp: 30 tablet, Rfl: 1    aspirin 81 MG EC tablet, Take 1 tablet by mouth Daily., Disp: 90 tablet, Rfl: 3    clopidogrel (PLAVIX) 75 MG tablet, Take 1 tablet by mouth Daily., Disp: 90 tablet, Rfl: 3       Objective:      Vitals:    04/24/25 1307   BP: 120/62   Pulse: 62   SpO2: 98%     Vitals and nursing note reviewed.   Constitutional:       Appearance: Normal and healthy appearance. Well-developed and not in distress.   Eyes:      Extraocular Movements: Extraocular movements intact.      Pupils: Pupils are equal, round, and reactive to light.   HENT:      Head: Normocephalic and atraumatic.    Mouth/Throat:      Pharynx: Oropharynx is clear.   Neck:      Vascular: JVD normal.      Trachea: Trachea normal.   Pulmonary:      Effort: Pulmonary effort is normal.      Breath sounds: Normal breath sounds. No wheezing. No rhonchi. No rales.   Cardiovascular:      PMI at left midclavicular line. Normal rate. Regular rhythm. Normal S1. Normal S2.       Murmurs: There is a grade 2/6 systolic murmur.      No gallop.  No click. No rub.   Pulses:     Dorsalis pedis: 2+ bilaterally.      Posterior tibial: 2+ bilaterally.  Abdominal:      General: Bowel sounds are normal.      Palpations: Abdomen is soft.      Tenderness: There is no abdominal tenderness.   Musculoskeletal: Normal range of motion.      Cervical back: Normal range of motion and neck supple. Skin:     General: Skin is warm and dry.      Capillary Refill: Capillary refill takes less than 2 seconds.   Feet:      Right foot:      Skin integrity: Skin integrity normal.      Left foot:      Skin integrity: Skin integrity normal.   Neurological:      Mental Status: Alert and oriented to person, place and time.      Sensory: Sensation is intact.      Motor: Motor function is intact.      Coordination: Coordination is intact.   Psychiatric:         Speech: Speech normal.         Behavior: Behavior is cooperative.       Physical Exam  Extremities: No swelling in the legs or ankles.  Skin: Itching in the legs.    Lab Review:         ECG 12 Lead    Date/Time: 4/24/2025 4:09 PM  Performed by: Taco Castañeda DO    Authorized by: Taco Castañeda DO  Comparison: compared with previous ECG   Similar to previous ECG  Rhythm: sinus rhythm  Rate: normal  Conduction: 1st degree AV block  QRS axis: normal  Other findings: non-specific ST-T wave changes    Clinical impression: abnormal EKG        Results  Labs   - Cholesterol: 206    Imaging   - Echo ultrasound of the heart: Ejection fraction was 41-45, trace mitral regurgitation    Assessment/Plan:     Problem List Items Addressed This Visit       Pure hypercholesterolemia    Relevant Medications    atorvastatin (LIPITOR) 40 MG tablet    Essential hypertension    Relevant Medications    carvedilol (COREG) 3.125 MG tablet    lisinopril (PRINIVIL,ZESTRIL) 5 MG tablet    Ischemic cardiomyopathy    Relevant Medications    clopidogrel (PLAVIX) 75 MG tablet    carvedilol (COREG) 3.125 MG tablet    nitroglycerin (NITROSTAT) 0.4 MG SL tablet    Other Relevant Orders    Lipid Panel    Adult  Stress Echo W/ Cont or Stress Agent if Necessary Per Protocol    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 Dizziness:  - Order stress test and echocardiogram to evaluate cardiac status  - Conduct blood work to assess lipid panel  - Reinitiate aspirin therapy  - Prescribe Plavix 75 mg  - Continue Coreg and lisinopril regimen  - Prescribe nitroglycerin tablets for use as needed  - Schedule earlier follow-up if test results indicate abnormalities    2. Shortness of Breath:  - Order stress test and echocardiogram to evaluate cardiac status  - Conduct blood work to assess lipid panel  - Reinitiate aspirin therapy  - Prescribe Plavix 75 mg  - Continue Coreg and lisinopril regimen  - Prescribe nitroglycerin tablets for use as needed  - Schedule earlier follow-up if test results indicate abnormalities    3. Fatigue:  - Order stress test and echocardiogram to evaluate cardiac status  - Conduct blood work to assess lipid panel  - Reinitiate aspirin therapy  - Prescribe Plavix 75 mg  - Continue Coreg and lisinopril regimen  - Prescribe nitroglycerin tablets for use as needed  - Schedule earlier follow-up if test results indicate abnormalities    Follow-up  Follow up in 3 months.      Recommendations/plans:    Transcribed from ambient dictation for Taco Castañeda DO by Taco Castañeda DO.  04/24/25   16:08 CDT    Patient or patient representative verbalized consent for the use of  Ambient Listening during the visit with  Taco Castañeda DO for chart documentation. 4/24/2025  16:08 CDT

## 2025-04-25 ENCOUNTER — HOSPITAL ENCOUNTER (OUTPATIENT)
Dept: CARDIOLOGY | Facility: HOSPITAL | Age: 70
Discharge: HOME | End: 2025-04-25
Payer: MEDICARE

## 2025-04-25 ENCOUNTER — LAB (OUTPATIENT)
Dept: LAB | Facility: HOSPITAL | Age: 70
End: 2025-04-25
Payer: MEDICARE

## 2025-04-25 VITALS
DIASTOLIC BLOOD PRESSURE: 62 MMHG | HEIGHT: 71 IN | BODY MASS INDEX: 34.72 KG/M2 | WEIGHT: 248 LBS | SYSTOLIC BLOOD PRESSURE: 120 MMHG

## 2025-04-25 DIAGNOSIS — I50.22 CHRONIC SYSTOLIC CONGESTIVE HEART FAILURE: ICD-10-CM

## 2025-04-25 DIAGNOSIS — I25.5 ISCHEMIC CARDIOMYOPATHY: ICD-10-CM

## 2025-04-25 LAB
CHOLEST SERPL-MCNC: 137 MG/DL (ref 0–200)
HDLC SERPL-MCNC: 27 MG/DL (ref 40–60)
LDLC SERPL CALC-MCNC: 84 MG/DL (ref 0–100)
LDLC/HDLC SERPL: 2.99 {RATIO}
TRIGL SERPL-MCNC: 147 MG/DL (ref 0–150)
VLDLC SERPL-MCNC: 26 MG/DL (ref 5–40)

## 2025-04-25 PROCEDURE — 93325 DOPPLER ECHO COLOR FLOW MAPG: CPT

## 2025-04-25 PROCEDURE — 93308 TTE F-UP OR LMTD: CPT

## 2025-04-25 PROCEDURE — 36415 COLL VENOUS BLD VENIPUNCTURE: CPT

## 2025-04-25 PROCEDURE — 25510000001 PERFLUTREN 6.52 MG/ML SUSPENSION: Performed by: EMERGENCY MEDICINE

## 2025-04-25 PROCEDURE — 80061 LIPID PANEL: CPT

## 2025-04-25 PROCEDURE — 93321 DOPPLER ECHO F-UP/LMTD STD: CPT

## 2025-04-25 RX ADMIN — PERFLUTREN 13.04 MG: 6.52 INJECTION, SUSPENSION INTRAVENOUS at 12:21

## 2025-04-26 LAB
AORTIC ARCH: 3 CM
AORTIC DIMENSIONLESS INDEX: 0.57 (DI)
ASCENDING AORTA: 3.8 CM
AV MEAN PRESS GRAD SYS DOP V1V2: 3.9 MMHG
AV VMAX SYS DOP: 128.1 CM/SEC
BH CV ECHO MEAS - AO MAX PG: 6.6 MMHG
BH CV ECHO MEAS - AO ROOT DIAM: 3.4 CM
BH CV ECHO MEAS - AO V2 VTI: 33 CM
BH CV ECHO MEAS - AVA(I,D): 2.2 CM2
BH CV ECHO MEAS - EDV(CUBED): 191.8 ML
BH CV ECHO MEAS - EDV(MOD-SP2): 133.1 ML
BH CV ECHO MEAS - EDV(MOD-SP4): 156.3 ML
BH CV ECHO MEAS - EF(MOD-SP2): 48.4 %
BH CV ECHO MEAS - EF(MOD-SP4): 41.6 %
BH CV ECHO MEAS - ESV(CUBED): 69.1 ML
BH CV ECHO MEAS - ESV(MOD-SP2): 68.7 ML
BH CV ECHO MEAS - ESV(MOD-SP4): 91.2 ML
BH CV ECHO MEAS - FS: 28.9 %
BH CV ECHO MEAS - IVS/LVPW: 1.06 CM
BH CV ECHO MEAS - IVSD: 0.97 CM
BH CV ECHO MEAS - LA DIMENSION: 5.1 CM
BH CV ECHO MEAS - LAT PEAK E' VEL: 9.8 CM/SEC
BH CV ECHO MEAS - LV DIASTOLIC VOL/BSA (35-75): 67.6 CM2
BH CV ECHO MEAS - LV MASS(C)D: 213.5 GRAMS
BH CV ECHO MEAS - LV MAX PG: 2.8 MMHG
BH CV ECHO MEAS - LV MEAN PG: 1.27 MMHG
BH CV ECHO MEAS - LV SYSTOLIC VOL/BSA (12-30): 39.5 CM2
BH CV ECHO MEAS - LV V1 MAX: 84.1 CM/SEC
BH CV ECHO MEAS - LV V1 VTI: 18.7 CM
BH CV ECHO MEAS - LVIDD: 5.8 CM
BH CV ECHO MEAS - LVIDS: 4.1 CM
BH CV ECHO MEAS - LVOT AREA: 3.9 CM2
BH CV ECHO MEAS - LVOT DIAM: 2.22 CM
BH CV ECHO MEAS - LVPWD: 0.92 CM
BH CV ECHO MEAS - MED PEAK E' VEL: 6.1 CM/SEC
BH CV ECHO MEAS - MV A MAX VEL: 85.4 CM/SEC
BH CV ECHO MEAS - MV DEC SLOPE: 244.5 CM/SEC2
BH CV ECHO MEAS - MV DEC TIME: 0.24 SEC
BH CV ECHO MEAS - MV E MAX VEL: 59.6 CM/SEC
BH CV ECHO MEAS - MV E/A: 0.7
BH CV ECHO MEAS - MV MAX PG: 3.6 MMHG
BH CV ECHO MEAS - MV MEAN PG: 1.29 MMHG
BH CV ECHO MEAS - MV V2 VTI: 32.2 CM
BH CV ECHO MEAS - MVA(VTI): 2.25 CM2
BH CV ECHO MEAS - PA V2 MAX: 97.8 CM/SEC
BH CV ECHO MEAS - PULM A REVS DUR: 0.14 SEC
BH CV ECHO MEAS - PULM A REVS VEL: 20.6 CM/SEC
BH CV ECHO MEAS - PULM DIAS VEL: 37.2 CM/SEC
BH CV ECHO MEAS - PULM S/D: 1.38
BH CV ECHO MEAS - PULM SYS VEL: 51.2 CM/SEC
BH CV ECHO MEAS - RAP SYSTOLE: 3 MMHG
BH CV ECHO MEAS - RV MAX PG: 2.9 MMHG
BH CV ECHO MEAS - RV V1 MAX: 84.9 CM/SEC
BH CV ECHO MEAS - RV V1 VTI: 19.2 CM
BH CV ECHO MEAS - RVDD: 3.5 CM
BH CV ECHO MEAS - RVSP: 27 MMHG
BH CV ECHO MEAS - SV(LVOT): 72.6 ML
BH CV ECHO MEAS - SV(MOD-SP2): 64.5 ML
BH CV ECHO MEAS - SV(MOD-SP4): 65 ML
BH CV ECHO MEAS - SVI(LVOT): 31.4 ML/M2
BH CV ECHO MEAS - SVI(MOD-SP2): 27.9 ML/M2
BH CV ECHO MEAS - SVI(MOD-SP4): 28.1 ML/M2
BH CV ECHO MEAS - TAPSE (>1.6): 2.6 CM
BH CV ECHO MEAS - TR MAX PG: 24 MMHG
BH CV ECHO MEAS - TR MAX VEL: 244.9 CM/SEC
BH CV ECHO MEASUREMENTS AVERAGE E/E' RATIO: 7.5
BH CV ECHO SHUNT ASSESSMENT PERFORMED (HIDDEN SCRIPTING): 1
BH CV XLRA - RV BASE: 4.2 CM
BH CV XLRA - RV LENGTH: 6.9 CM
BH CV XLRA - RV MID: 2.8 CM
BH CV XLRA - TDI S': 13 CM/SEC
LEFT ATRIUM VOLUME INDEX: 40 ML/M2
LEFT ATRIUM VOLUME: 85 ML
LV EF BIPLANE MOD: 46 %

## 2025-04-28 ENCOUNTER — HOSPITAL ENCOUNTER (OUTPATIENT)
Dept: CARDIOLOGY | Facility: HOSPITAL | Age: 70
Discharge: HOME | End: 2025-04-28
Admitting: EMERGENCY MEDICINE
Payer: MEDICARE

## 2025-04-28 VITALS — WEIGHT: 248 LBS | BODY MASS INDEX: 34.72 KG/M2 | HEIGHT: 71 IN

## 2025-04-28 DIAGNOSIS — I25.5 ISCHEMIC CARDIOMYOPATHY: ICD-10-CM

## 2025-04-28 PROCEDURE — 93350 STRESS TTE ONLY: CPT

## 2025-04-28 PROCEDURE — 25010000002 DOBUTAMINE PER 250 MG: Performed by: EMERGENCY MEDICINE

## 2025-04-28 PROCEDURE — 93017 CV STRESS TEST TRACING ONLY: CPT

## 2025-04-28 PROCEDURE — 25510000001 PERFLUTREN 6.52 MG/ML SUSPENSION: Performed by: EMERGENCY MEDICINE

## 2025-04-28 PROCEDURE — 25010000002 ATROPINE SULFATE 0.1 MG/ML: Performed by: EMERGENCY MEDICINE

## 2025-04-28 RX ORDER — DOBUTAMINE HYDROCHLORIDE 100 MG/100ML
10 INJECTION INTRAVENOUS
Status: DISCONTINUED | OUTPATIENT
Start: 2025-04-28 | End: 2025-04-29 | Stop reason: HOSPADM

## 2025-04-28 RX ORDER — METOPROLOL TARTRATE 1 MG/ML
5 INJECTION, SOLUTION INTRAVENOUS ONCE
Status: COMPLETED | OUTPATIENT
Start: 2025-04-28 | End: 2025-04-28

## 2025-04-28 RX ADMIN — METOPROLOL TARTRATE 5 MG: 5 INJECTION INTRAVENOUS at 09:38

## 2025-04-28 RX ADMIN — ATROPINE SULFATE 1 MG: 0.1 INJECTION INTRAVENOUS at 09:36

## 2025-04-28 RX ADMIN — PERFLUTREN 8.48 MG: 6.52 INJECTION, SUSPENSION INTRAVENOUS at 08:59

## 2025-04-28 RX ADMIN — DOBUTAMINE HYDROCHLORIDE 10 MCG/KG/MIN: 100 INJECTION INTRAVENOUS at 09:00

## 2025-04-30 LAB
BH CV STRESS BP STAGE 1: NORMAL
BH CV STRESS BP STAGE 2: NORMAL
BH CV STRESS BP STAGE 3: NORMAL
BH CV STRESS DOB - ATROPINE STAGE 3: 1
BH CV STRESS DOSE DOBUTAMINE STAGE 1: 10
BH CV STRESS DOSE DOBUTAMINE STAGE 2: 20
BH CV STRESS DOSE DOBUTAMINE STAGE 3: 30
BH CV STRESS DURATION MIN STAGE 1: 3
BH CV STRESS DURATION MIN STAGE 2: 3
BH CV STRESS DURATION MIN STAGE 3: 4
BH CV STRESS DURATION SEC STAGE 1: 0
BH CV STRESS DURATION SEC STAGE 2: 0
BH CV STRESS DURATION SEC STAGE 3: 4
BH CV STRESS HR STAGE 1: 63
BH CV STRESS HR STAGE 2: 66
BH CV STRESS HR STAGE 3: 144
BH CV STRESS PROTOCOL 1: NORMAL
BH CV STRESS RECOVERY BP: NORMAL MMHG
BH CV STRESS RECOVERY HR: 82 BPM
BH CV STRESS STAGE 1: 1
BH CV STRESS STAGE 2: 2
BH CV STRESS STAGE 3: 3
MAXIMAL PREDICTED HEART RATE: 152 BPM
PERCENT MAX PREDICTED HR: 94.74 %
STRESS BASELINE BP: NORMAL MMHG
STRESS BASELINE HR: 63 BPM
STRESS PERCENT HR: 111 %
STRESS POST EXERCISE DUR MIN: 10 MIN
STRESS POST EXERCISE DUR SEC: 4 SEC
STRESS POST PEAK BP: NORMAL MMHG
STRESS POST PEAK HR: 144 BPM
STRESS TARGET HR: 129 BPM

## 2025-05-02 DIAGNOSIS — R94.39 ABNORMAL STRESS TEST: ICD-10-CM

## 2025-05-02 DIAGNOSIS — I25.5 ISCHEMIC CARDIOMYOPATHY: Primary | ICD-10-CM

## 2025-05-12 ENCOUNTER — HOSPITAL ENCOUNTER (OUTPATIENT)
Facility: HOSPITAL | Age: 70
Setting detail: HOSPITAL OUTPATIENT SURGERY
Discharge: HOME | End: 2025-05-12
Attending: EMERGENCY MEDICINE | Admitting: EMERGENCY MEDICINE
Payer: MEDICARE

## 2025-05-12 VITALS
SYSTOLIC BLOOD PRESSURE: 151 MMHG | TEMPERATURE: 96.5 F | WEIGHT: 242 LBS | DIASTOLIC BLOOD PRESSURE: 75 MMHG | OXYGEN SATURATION: 98 % | HEART RATE: 62 BPM | BODY MASS INDEX: 32.78 KG/M2 | RESPIRATION RATE: 16 BRPM | HEIGHT: 72 IN

## 2025-05-12 DIAGNOSIS — I25.5 ISCHEMIC CARDIOMYOPATHY: ICD-10-CM

## 2025-05-12 DIAGNOSIS — R94.39 ABNORMAL STRESS TEST: ICD-10-CM

## 2025-05-12 LAB
ANION GAP SERPL CALCULATED.3IONS-SCNC: 9 MMOL/L (ref 5–15)
BUN SERPL-MCNC: 14 MG/DL (ref 8–23)
BUN/CREAT SERPL: 14.3 (ref 7–25)
CALCIUM SPEC-SCNC: 9.2 MG/DL (ref 8.6–10.5)
CHLORIDE SERPL-SCNC: 104 MMOL/L (ref 98–107)
CO2 SERPL-SCNC: 26 MMOL/L (ref 22–29)
CREAT SERPL-MCNC: 0.98 MG/DL (ref 0.76–1.27)
DEPRECATED RDW RBC AUTO: 41.1 FL (ref 37–54)
EGFRCR SERPLBLD CKD-EPI 2021: 84 ML/MIN/1.73
ERYTHROCYTE [DISTWIDTH] IN BLOOD BY AUTOMATED COUNT: 12.6 % (ref 12.3–15.4)
GLUCOSE SERPL-MCNC: 98 MG/DL (ref 65–99)
HCT VFR BLD AUTO: 43.3 % (ref 37.5–51)
HGB BLD-MCNC: 14.6 G/DL (ref 13–17.7)
INR PPP: 0.99 (ref 0.91–1.09)
MCH RBC QN AUTO: 29.8 PG (ref 26.6–33)
MCHC RBC AUTO-ENTMCNC: 33.7 G/DL (ref 31.5–35.7)
MCV RBC AUTO: 88.4 FL (ref 79–97)
PLATELET # BLD AUTO: 227 10*3/MM3 (ref 140–450)
PMV BLD AUTO: 10 FL (ref 6–12)
POTASSIUM SERPL-SCNC: 4.4 MMOL/L (ref 3.5–5.2)
PROTHROMBIN TIME: 13.6 SECONDS (ref 11.8–14.8)
RBC # BLD AUTO: 4.9 10*6/MM3 (ref 4.14–5.8)
SODIUM SERPL-SCNC: 139 MMOL/L (ref 136–145)
WBC NRBC COR # BLD AUTO: 7.72 10*3/MM3 (ref 3.4–10.8)

## 2025-05-12 PROCEDURE — 25010000002 MIDAZOLAM HCL (PF) 5 MG/5ML SOLUTION: Performed by: EMERGENCY MEDICINE

## 2025-05-12 PROCEDURE — 93458 L HRT ARTERY/VENTRICLE ANGIO: CPT | Performed by: EMERGENCY MEDICINE

## 2025-05-12 PROCEDURE — 25010000002 FENTANYL CITRATE (PF) 50 MCG/ML SOLUTION: Performed by: EMERGENCY MEDICINE

## 2025-05-12 PROCEDURE — 25010000002 HEPARIN (PORCINE) 2000-0.9 UNIT/L-% SOLUTION: Performed by: EMERGENCY MEDICINE

## 2025-05-12 PROCEDURE — C1760 CLOSURE DEV, VASC: HCPCS | Performed by: EMERGENCY MEDICINE

## 2025-05-12 PROCEDURE — C1894 INTRO/SHEATH, NON-LASER: HCPCS | Performed by: EMERGENCY MEDICINE

## 2025-05-12 PROCEDURE — S0260 H&P FOR SURGERY: HCPCS | Performed by: EMERGENCY MEDICINE

## 2025-05-12 PROCEDURE — 25510000001 IOPAMIDOL PER 1 ML: Performed by: EMERGENCY MEDICINE

## 2025-05-12 PROCEDURE — C1769 GUIDE WIRE: HCPCS | Performed by: EMERGENCY MEDICINE

## 2025-05-12 PROCEDURE — 25010000002 LIDOCAINE 2% SOLUTION: Performed by: EMERGENCY MEDICINE

## 2025-05-12 PROCEDURE — 85610 PROTHROMBIN TIME: CPT | Performed by: EMERGENCY MEDICINE

## 2025-05-12 PROCEDURE — 85027 COMPLETE CBC AUTOMATED: CPT | Performed by: EMERGENCY MEDICINE

## 2025-05-12 PROCEDURE — 99152 MOD SED SAME PHYS/QHP 5/>YRS: CPT | Performed by: EMERGENCY MEDICINE

## 2025-05-12 PROCEDURE — 25010000002 HEPARIN (PORCINE) 1000-0.9 UT/500ML-% SOLUTION: Performed by: EMERGENCY MEDICINE

## 2025-05-12 PROCEDURE — 80048 BASIC METABOLIC PNL TOTAL CA: CPT | Performed by: EMERGENCY MEDICINE

## 2025-05-12 PROCEDURE — 25010000002 DIPHENHYDRAMINE PER 50 MG: Performed by: EMERGENCY MEDICINE

## 2025-05-12 RX ORDER — IOPAMIDOL 755 MG/ML
INJECTION, SOLUTION INTRAVASCULAR
Status: DISCONTINUED | OUTPATIENT
Start: 2025-05-12 | End: 2025-05-12 | Stop reason: HOSPADM

## 2025-05-12 RX ORDER — MIDAZOLAM HYDROCHLORIDE 5 MG/5ML
INJECTION, SOLUTION INTRAMUSCULAR; INTRAVENOUS
Status: DISCONTINUED | OUTPATIENT
Start: 2025-05-12 | End: 2025-05-12 | Stop reason: HOSPADM

## 2025-05-12 RX ORDER — FUROSEMIDE 20 MG/1
20 TABLET ORAL DAILY
Qty: 90 TABLET | Refills: 3 | Status: SHIPPED | OUTPATIENT
Start: 2025-05-12

## 2025-05-12 RX ORDER — SACUBITRIL AND VALSARTAN 49; 51 MG/1; MG/1
1 TABLET, FILM COATED ORAL 2 TIMES DAILY
Qty: 180 TABLET | Refills: 3 | Status: SHIPPED | OUTPATIENT
Start: 2025-05-12

## 2025-05-12 RX ORDER — HEPARIN SODIUM 200 [USP'U]/100ML
INJECTION, SOLUTION INTRAVENOUS
Status: DISCONTINUED | OUTPATIENT
Start: 2025-05-12 | End: 2025-05-12 | Stop reason: HOSPADM

## 2025-05-12 RX ORDER — DIPHENHYDRAMINE HYDROCHLORIDE 50 MG/ML
INJECTION, SOLUTION INTRAMUSCULAR; INTRAVENOUS
Status: DISCONTINUED | OUTPATIENT
Start: 2025-05-12 | End: 2025-05-12 | Stop reason: HOSPADM

## 2025-05-12 RX ORDER — FENTANYL CITRATE 50 UG/ML
INJECTION, SOLUTION INTRAMUSCULAR; INTRAVENOUS
Status: DISCONTINUED | OUTPATIENT
Start: 2025-05-12 | End: 2025-05-12 | Stop reason: HOSPADM

## 2025-05-12 RX ORDER — LIDOCAINE HYDROCHLORIDE 20 MG/ML
INJECTION, SOLUTION INFILTRATION; PERINEURAL
Status: DISCONTINUED | OUTPATIENT
Start: 2025-05-12 | End: 2025-05-12 | Stop reason: HOSPADM

## 2025-05-12 NOTE — OP NOTE
[facility]    Date of procedure: 5/12/2025     Procedures performed:     1.  Access to the right femoral artery via modified Seldinger technique and ultrasound guidance  2.  Left heart catheterization with retrograde crossing Revonto left ventricle pressure recorded  3.  LV ventriculography  4.  Selective bilateral coronary angiography  5.  Conscious sedation with continuous hemodynamic monitoring for 25 minutes  6.  Patent hemostasis achieved in the right femoral artery access site using a 6 Ukrainian Angio-Seal closure device    Risk, Benefits, and Alternatives discussed with the patient and/or family.  Plan is for moderate sedation and the patient agrees to proceed with the procedure.  An immediate assessment was done prior to the administration of moderate sedation     Indication: Abnormal stress test, history of coronary artery disease status post stenting approximately 2 years ago  Premedication: Versed, fentanyl  Contrast: Isovue 85 cc  Radiation: Flouro time= 1.6 minutes. Air Kerma= 342 mGy  Catheters: 6Fr JL4, 6Fr JR4, 6Fr angled pigtail        Procedural details:    The patient was brought to the cath lab and prepped and draped in the usual fashion.  Access was obtained in the right femoral artery via modified Seldinger technique and ultrasound guidance.  A long 6 Ukrainian sheath was inserted over a Amplatz Super Stiff wire.  A JL 4 was inserted and used to engage the left main selective left coronary angiography for multiple views.  JR4 was inserted used to engage the right and selective right coronary angiography for multiple views.  Pigtail catheter was then inserted and used to cross the aortic valve to the left ventricle where pressures were recorded.  Cath was pulled back cross aortic valve and again pressures were recorded.  Everything was then withdrawn to the sheath and the sheath was flushed.  Pain hemostasis was achieved in the right femoral artery access site using a 6  Thai Angio-Seal closure device.  Patient tolerated procedure well without any complications.  He left the Cath Lab in stable condition.    I supervised the administration of conscious sedation by nursing staff throughout the case.  First dose was given at 1606 and the end of my face-to-face encounter was at 1629, accounting for a total of 25 minutes of supervision.  During the case, continuous pulse oximetry, heart rate, blood pressure, and patient status were monitored.     Findings:    Hemodynamics:    Aortic: 167/75 mmHg  LV: 179/1 mmHg  LVEDP: 14 mmHg    Left ventriculography:  1. The contractility of the left ventricle is mildly reduced.  Estimated ejection fraction 45%.  2. The left ventricle is normal in wall thickness and chamber size.  3. There is no significant mitral regurgitation or aortic insufficiency.    Selective coronary angiography:     Left main: Left main is a large-caliber vessel that bifurcates into the LAD and left circumflex coronary arteries, no angiographic evidence of stenosis, MADELIN-3 flow    LAD: The LAD is a large-caliber vessel and has a patent stent in the proximal segment with minimal in-stent stenosis, remainder the vessel has minor luminal irregularities, MADELIN-3 flow.    Diagonals: First diagonal is large caliber with multiple branches and mild disease, the second diagonal is small to moderate caliber with no significant disease    Left circumflex: Left circumflex is a large-caliber vessel with no angiographic evidence of stenosis, MADELIN-3 flow    Obtuse marginals: There is 1 moderate caliber obtuse marginal branch with no significant disease    RCA: The RCA is a large-caliber, dominant vessel that has aneurysmal segments present in the proximal and mid vessel, there is approximately 10 to 20% stenosis in the distal segment, MADELIN-3 flow    PDA/YENI: Both of these are moderate caliber with mild disease      Estimated Blood Loss: 50 cc    Specimens: None    Complications: None        Impression:  1.  Coronary artery disease as described above including a widely patent stent in the proximal LAD with only minimal in-stent stenosis  2.  Mildly reduced ejection fraction, EF approximately 45%  3.  Hypertension  4.  Hyperlipidemia     Plan:   1.  Discharge home later today  2.  Continue on dual antiplatelet therapy  3.  Change lisinopril to Entresto  4.  Continue beta-blocker at current dose  5.  Start low-dose Lasix  6.  Start Jardiance  7.  Follow-up in the office in 3 months to continue to optimize cardiovascular regimen    Taco Castañeda, DO  Interventional cardiology  [facility]  May 12, 2025

## 2025-05-12 NOTE — H&P
"Patient seen and examined at bedside.  History and physical not changed as below.    Will be performing a diagnostic left heart catheterization with possible invention based on fines.    Risk, benefits and alternatives Splane the patient wished to proceed.    Subjective:      Encounter Date:2025     Subjective  Patient ID: Lm Nguyễn is a 69 y.o. male.     Chief Complaint:  History of Present Illness  History of Present Illness  The patient presents for evaluation of dizziness, shortness of breath, and fatigue.     Dizziness upon standing, accompanied by shortness of breath, is reported. A persistent feeling of fatigue is also noted. Approximately 3 to 4 months ago, an episode of syncope occurred, resulting in a fall from a lawnmower. The patient has been taken off aspirin by the pharmacy and is currently on a regimen of four pills daily. Blood pressure and heart rate are within normal limits. No leg or ankle swelling is reported, although there is itching in the legs. Wide feet are also mentioned.     PAST SURGICAL HISTORY:  In , an echocardiogram revealed a slightly weak heart with an ejection fraction of 41-45% and trace mitral regurgitation. A heart catheterization was performed for an NSTEMI, and a stent was placed in the left anterior descending artery (\" maker\").     FAMILY HISTORY  - Brother:  last fall from a heart attack     The following portions of the patient's history were reviewed and updated as appropriate: allergies, current medications, past family history, past medical history, past social history, past surgical history, and problem list.     Review of Systems   Constitutional: Positive for malaise/fatigue. Negative for diaphoresis and fever.   HENT:  Negative for congestion.    Eyes:  Negative for vision loss in left eye and vision loss in right eye.   Cardiovascular:  Positive for chest pain. Negative for claudication, dyspnea on exertion, irregular heartbeat, leg " swelling, orthopnea, palpitations and syncope.   Respiratory:  Positive for shortness of breath. Negative for cough and wheezing.    Hematologic/Lymphatic: Negative for adenopathy.   Skin:  Negative for rash.   Musculoskeletal:  Negative for joint pain and joint swelling.   Gastrointestinal:  Negative for abdominal pain, diarrhea, nausea and vomiting.   Neurological:  Positive for dizziness. Negative for excessive daytime sleepiness, focal weakness, light-headedness, numbness and weakness.   Psychiatric/Behavioral:  Negative for depression. The patient does not have insomnia.               Current Medications      Current Outpatient Medications:     atorvastatin (LIPITOR) 40 MG tablet, Take 1 tablet by mouth every night at bedtime., Disp: 90 tablet, Rfl: 3    carvedilol (COREG) 3.125 MG tablet, Take 1 tablet by mouth 2 (Two) Times a Day With Meals., Disp: 180 tablet, Rfl: 3    lisinopril (PRINIVIL,ZESTRIL) 5 MG tablet, Take 1 tablet by mouth Daily., Disp: 90 tablet, Rfl: 3    nitroglycerin (NITROSTAT) 0.4 MG SL tablet, Place 1 tablet under the tongue Every 5 (Five) Minutes As Needed for Chest Pain. Take no more than 3 doses in 15 minutes., Disp: 30 tablet, Rfl: 1    aspirin 81 MG EC tablet, Take 1 tablet by mouth Daily., Disp: 90 tablet, Rfl: 3    clopidogrel (PLAVIX) 75 MG tablet, Take 1 tablet by mouth Daily., Disp: 90 tablet, Rfl: 3           Objective:      Objective      Vitals:     04/24/25 1307   BP: 120/62   Pulse: 62   SpO2: 98%      Vitals and nursing note reviewed.   Constitutional:       Appearance: Normal and healthy appearance. Well-developed and not in distress.   Eyes:      Extraocular Movements: Extraocular movements intact.      Pupils: Pupils are equal, round, and reactive to light.   HENT:      Head: Normocephalic and atraumatic.    Mouth/Throat:      Pharynx: Oropharynx is clear.   Neck:      Vascular: JVD normal.      Trachea: Trachea normal.   Pulmonary:      Effort: Pulmonary effort is  normal.      Breath sounds: Normal breath sounds. No wheezing. No rhonchi. No rales.   Cardiovascular:      PMI at left midclavicular line. Normal rate. Regular rhythm. Normal S1. Normal S2.       Murmurs: There is a grade 2/6 systolic murmur.      No gallop.  No click. No rub.   Pulses:     Dorsalis pedis: 2+ bilaterally.     Posterior tibial: 2+ bilaterally.  Abdominal:      General: Bowel sounds are normal.      Palpations: Abdomen is soft.      Tenderness: There is no abdominal tenderness.   Musculoskeletal: Normal range of motion.      Cervical back: Normal range of motion and neck supple. Skin:     General: Skin is warm and dry.      Capillary Refill: Capillary refill takes less than 2 seconds.   Feet:      Right foot:      Skin integrity: Skin integrity normal.      Left foot:      Skin integrity: Skin integrity normal.   Neurological:      Mental Status: Alert and oriented to person, place and time.      Sensory: Sensation is intact.      Motor: Motor function is intact.      Coordination: Coordination is intact.   Psychiatric:         Speech: Speech normal.         Behavior: Behavior is cooperative.         Physical Exam  Extremities: No swelling in the legs or ankles.  Skin: Itching in the legs.     Lab Review:            ECG 12 Lead     Date/Time: 4/24/2025 4:09 PM  Performed by: Taco Castañeda DO     Authorized by: Taco Castañeda DO  Comparison: compared with previous ECG   Similar to previous ECG  Rhythm: sinus rhythm  Rate: normal  Conduction: 1st degree AV block  QRS axis: normal  Other findings: non-specific ST-T wave changes     Clinical impression: abnormal EKG           Results  Labs   - Cholesterol: 206     Imaging   - Echo ultrasound of the heart: Ejection fraction was 41-45, trace mitral regurgitation     Assessment/Plan:      Problem List Items Addressed This Visit         Pure hypercholesterolemia     Relevant Medications     atorvastatin (LIPITOR) 40 MG tablet      Essential hypertension     Relevant Medications     carvedilol (COREG) 3.125 MG tablet     lisinopril (PRINIVIL,ZESTRIL) 5 MG tablet     Ischemic cardiomyopathy     Relevant Medications     clopidogrel (PLAVIX) 75 MG tablet     carvedilol (COREG) 3.125 MG tablet     nitroglycerin (NITROSTAT) 0.4 MG SL tablet     Other Relevant Orders     Lipid Panel     Adult Stress Echo W/ Cont or Stress Agent if Necessary Per Protocol     Coronary artery disease involving coronary bypass graft of native heart with refractory angina pectoris     Relevant Medications     clopidogrel (PLAVIX) 75 MG tablet     carvedilol (COREG) 3.125 MG tablet     nitroglycerin (NITROSTAT) 0.4 MG SL tablet     Chronic systolic congestive heart failure - Primary     Relevant Medications     clopidogrel (PLAVIX) 75 MG tablet     carvedilol (COREG) 3.125 MG tablet     nitroglycerin (NITROSTAT) 0.4 MG SL tablet     Other Relevant Orders     Lipid Panel     Adult Transthoracic Echo Limited W/ Cont if Necessary Per Protocol      Assessment & Plan  1. Dizziness:  - Order stress test and echocardiogram to evaluate cardiac status  - Conduct blood work to assess lipid panel  - Reinitiate aspirin therapy  - Prescribe Plavix 75 mg  - Continue Coreg and lisinopril regimen  - Prescribe nitroglycerin tablets for use as needed  - Schedule earlier follow-up if test results indicate abnormalities     2. Shortness of Breath:  - Order stress test and echocardiogram to evaluate cardiac status  - Conduct blood work to assess lipid panel  - Reinitiate aspirin therapy  - Prescribe Plavix 75 mg  - Continue Coreg and lisinopril regimen  - Prescribe nitroglycerin tablets for use as needed  - Schedule earlier follow-up if test results indicate abnormalities     3. Fatigue:  - Order stress test and echocardiogram to evaluate cardiac status  - Conduct blood work to assess lipid panel  - Reinitiate aspirin therapy  - Prescribe Plavix 75 mg  - Continue Coreg and lisinopril  regimen  - Prescribe nitroglycerin tablets for use as needed  - Schedule earlier follow-up if test results indicate abnormalities     Follow-up  Follow up in 3 months.        Recommendations/plans:     Transcribed from ambient dictation for Taco Castañeda DO by Taco Castañeda DO.  04/24/25   16:08 CDT     Patient or patient representative verbalized consent for the use of Ambient Listening during the visit with  Taco Castañeda DO for chart documentation. 4/24/2025  16:08 CDT       Contains text generated by [employer]

## 2025-05-19 ENCOUNTER — TELEPHONE (OUTPATIENT)
Dept: CARDIOLOGY | Facility: CLINIC | Age: 70
End: 2025-05-19

## 2025-05-19 NOTE — TELEPHONE ENCOUNTER
Caller: Lm Nguyễn    Relationship: Self    Best call back number: [phone]     What is the best time to reach you: ANYTIME     Who are you requesting to speak with (clinical staff, provider,  specific staff member): CLINICAL       What was the call regarding: PATIENT HAD HEART CATH ON 5/12/25 AND WAS SUPPOSED TO START TWO NEW MEDICATIONS. ONE WAS ENTRESTO AND PATIENT STATES THAT IT IS TOO EXPENSIVE AND NEEDS TO BE SWITCHED TO  DIFFERENT SOMETHING     Is it okay if the provider responds through MyChart:NO

## 2025-07-28 ENCOUNTER — TELEPHONE (OUTPATIENT)
Dept: CARDIOLOGY | Facility: CLINIC | Age: 70
End: 2025-07-28
Payer: MEDICARE

## 2025-07-28 NOTE — TELEPHONE ENCOUNTER
Medications reviewed with patient. He has not received patient assistance forms sent 5/20/2025. Patient forms have been resent today at 1430.

## 2025-07-28 NOTE — TELEPHONE ENCOUNTER
Caller: Lm Nguyễn    Relationship: Self    Best call back number: [phone]    Who are you requesting to speak with (clinical staff, provider,  specific staff member): DR REDD OR NURSE      What was the call regarding: PT ASKING FOR A CALL BACK FROM DR REDD OR A NURSE TO DISCUSS WHAT MEDS HE IS SUPPOSED TO STILL BE TAKING AND WHICH MEDS TO STOP.

## 2025-08-19 ENCOUNTER — TELEPHONE (OUTPATIENT)
Dept: CARDIOLOGY | Facility: CLINIC | Age: 70
End: 2025-08-19
Payer: MEDICARE

## (undated) DEVICE — MODEL AT P65, P/N 701554-001KIT CONTENTS: HAND CONTROLLER, 3-WAY HIGH-PRESSURE STOPCOCK WITH ROTATING END AND PREMIUM HIGH-PRESSURE TUBING: Brand: ANGIOTOUCH® KIT

## (undated) DEVICE — INFLATION DEVICE: Brand: ENCORE™ 26

## (undated) DEVICE — PTCA DILATATION CATHETER: Brand: NC QUANTUM APEX™

## (undated) DEVICE — PINNACLE INTRODUCER SHEATH: Brand: PINNACLE

## (undated) DEVICE — SOLIDIFIER LIQUI LOC PLUS 2000CC

## (undated) DEVICE — CANN NASL ETCO2 LO/FLO A/

## (undated) DEVICE — KT NDL GUIDE STRL 18GA

## (undated) DEVICE — MODEL BT2000 P/N 700287-012KIT CONTENTS: MANIFOLD WITH SALINE AND CONTRAST PORTS, SALINE TUBING WITH SPIKE AND HAND SYRINGE, TRANSDUCER: Brand: BT2000 AUTOMATED MANIFOLD KIT

## (undated) DEVICE — SOL IRR NACL 0.9PCT BO 1000ML

## (undated) DEVICE — HI-TORQUE BALANCE MIDDLEWEIGHT UNIVERSAL II GUIDE WIRE STRAIGHT TIP PAK  190 CM: Brand: HI-TORQUE BALANCE MIDDLEWEIGHT UNIVERSAL II

## (undated) DEVICE — SOL IRR NACL 0.9PCT BT 1000ML

## (undated) DEVICE — NC TREK CORONARY DILATATION CATHETER 5.0 MM X 8 MM / RAPID-EXCHANGE: Brand: NC TREK

## (undated) DEVICE — FR5 INFINITI MULTIPAC: Brand: INFINITI

## (undated) DEVICE — ANGIO-SEAL VIP VASCULAR CLOSURE DEVICE: Brand: ANGIO-SEAL

## (undated) DEVICE — INF TL MULIPACK FR6: Brand: INFINITI

## (undated) DEVICE — PAD, DEFIB, ADULT, RADIOTRANS, PHYSIO: Brand: MEDLINE

## (undated) DEVICE — COPILOT BLEEDBACK CONTROL VALVE: Brand: COPILOT

## (undated) DEVICE — GW STARTER FXD CORE J .035 3X150CM 3MM

## (undated) DEVICE — GW STARTER FXD/CORE PTFE/COAT J/TP/3MM .035IN 10X150CM

## (undated) DEVICE — 6F .070 XB 3.5 100CM: Brand: VISTA BRITE TIP

## (undated) DEVICE — SOLIDIFIER LIQ LIQUILOC/PLUS W/TREAT 2000CC

## (undated) DEVICE — PK CATH CARD 30 CA/4

## (undated) DEVICE — GW AMPLTZ SUPERSTIFF JTIP .035IN 180CM

## (undated) DEVICE — PERCLOSE™ PROSTYLE™ SUTURE-MEDIATED CLOSURE AND REPAIR SYSTEM: Brand: PERCLOSE™ PROSTYLE™

## (undated) DEVICE — TOOL INSRT GW MTL OR PLSTC

## (undated) DEVICE — NC TREK CORONARY DILATATION CATHETER 5.0 MM X 12 MM / RAPID-EXCHANGE: Brand: NC TREK